# Patient Record
Sex: FEMALE | Race: WHITE | Employment: PART TIME | URBAN - NONMETROPOLITAN AREA
[De-identification: names, ages, dates, MRNs, and addresses within clinical notes are randomized per-mention and may not be internally consistent; named-entity substitution may affect disease eponyms.]

---

## 2017-10-07 ENCOUNTER — HOSPITAL ENCOUNTER (EMERGENCY)
Age: 37
Discharge: HOME | End: 2017-10-07
Payer: MEDICAID

## 2017-10-07 ENCOUNTER — APPOINTMENT (OUTPATIENT)
Dept: GENERAL RADIOLOGY | Age: 37
End: 2017-10-07
Payer: MEDICAID

## 2017-10-07 VITALS
RESPIRATION RATE: 14 BRPM | HEART RATE: 90 BPM | BODY MASS INDEX: 23.78 KG/M2 | TEMPERATURE: 98.9 F | WEIGHT: 130 LBS | DIASTOLIC BLOOD PRESSURE: 65 MMHG | OXYGEN SATURATION: 100 % | SYSTOLIC BLOOD PRESSURE: 115 MMHG

## 2017-10-07 DIAGNOSIS — S90.212A CONTUSION OF LEFT GREAT TOE WITH DAMAGE TO NAIL, INITIAL ENCOUNTER: Primary | ICD-10-CM

## 2017-10-07 DIAGNOSIS — B35.1 ONYCHOMYCOSIS: ICD-10-CM

## 2017-10-07 PROCEDURE — 73660 X-RAY EXAM OF TOE(S): CPT

## 2017-10-07 PROCEDURE — 99283 EMERGENCY DEPT VISIT LOW MDM: CPT

## 2017-10-07 ASSESSMENT — PAIN SCALES - GENERAL: PAINLEVEL_OUTOF10: 8

## 2017-10-07 ASSESSMENT — ENCOUNTER SYMPTOMS
RHINORRHEA: 0
EYE DISCHARGE: 0
SHORTNESS OF BREATH: 0
WHEEZING: 0
SORE THROAT: 0
COUGH: 0
ABDOMINAL PAIN: 0
VOMITING: 0
NAUSEA: 0
EYE PAIN: 0
DIARRHEA: 0
BACK PAIN: 0

## 2017-10-07 ASSESSMENT — PAIN DESCRIPTION - LOCATION: LOCATION: FOOT

## 2017-10-07 ASSESSMENT — PAIN DESCRIPTION - ORIENTATION: ORIENTATION: LEFT

## 2017-10-07 ASSESSMENT — PAIN DESCRIPTION - PAIN TYPE: TYPE: ACUTE PAIN

## 2017-10-07 NOTE — ED PROVIDER NOTES
of 10/7/2017  8:10 AM      CONTINUE these medications which have NOT CHANGED    Details   ibuprofen (ADVIL;MOTRIN) 800 MG tablet Take 1 tablet by mouth every 8 hours as needed for Pain, Disp-120 tablet, R-3      Prenatal Multivit-Min-Fe-FA (PRENATAL #2 PO) Take 1 tablet by mouth. ALLERGIES     has No Known Allergies. FAMILY HISTORY     indicated that her mother is alive. She indicated that her father is . She indicated that her sister is alive. She indicated that her brother is alive. family history includes Early Death in her father; Mental Illness in her father. SOCIAL HISTORY      reports that she has been smoking. She has a 5.00 pack-year smoking history. She does not have any smokeless tobacco history on file. She reports that she does not drink alcohol or use drugs. PHYSICAL EXAM     INITIAL VITALS:  weight is 130 lb (59 kg). Her oral temperature is 98.9 °F (37.2 °C). Her blood pressure is 115/65 and her pulse is 90. Her respiration is 14 and oxygen saturation is 100%. Physical Exam   Constitutional: She is oriented to person, place, and time. She appears well-developed and well-nourished. HENT:   Head: Normocephalic. Right Ear: External ear normal.   Left Ear: External ear normal.   Eyes: Conjunctivae are normal. Right eye exhibits no discharge. Left eye exhibits no discharge. Neck: Normal range of motion. Neck supple. No JVD present. Cardiovascular: Normal rate, regular rhythm and normal heart sounds. No murmur heard. Pulmonary/Chest: Effort normal and breath sounds normal. No respiratory distress. She has no wheezes. She has no rales. Abdominal: Soft. Normal appearance and bowel sounds are normal. She exhibits no distension. There is no tenderness. There is no rigidity, no rebound and no guarding. Musculoskeletal: Normal range of motion. Left foot: There is tenderness (with bruising in the nailbed of the 1st digit).  There is normal range of motion,

## 2020-03-01 ENCOUNTER — HOSPITAL ENCOUNTER (EMERGENCY)
Age: 40
Discharge: HOME | End: 2020-03-01
Payer: MEDICAID

## 2020-03-01 VITALS
HEART RATE: 110 BPM | TEMPERATURE: 97.7 F | OXYGEN SATURATION: 96 % | WEIGHT: 120 LBS | RESPIRATION RATE: 14 BRPM | SYSTOLIC BLOOD PRESSURE: 128 MMHG | BODY MASS INDEX: 21.95 KG/M2 | DIASTOLIC BLOOD PRESSURE: 75 MMHG

## 2020-03-01 LAB
GROUP A STREP CULTURE, REFLEX: NEGATIVE
REFLEX THROAT C + S: NORMAL

## 2020-03-01 PROCEDURE — 99282 EMERGENCY DEPT VISIT SF MDM: CPT

## 2020-03-01 PROCEDURE — 87880 STREP A ASSAY W/OPTIC: CPT

## 2020-03-01 PROCEDURE — 87070 CULTURE OTHR SPECIMN AEROBIC: CPT

## 2020-03-01 ASSESSMENT — ENCOUNTER SYMPTOMS
SHORTNESS OF BREATH: 0
PHOTOPHOBIA: 0
EYE REDNESS: 0
FACIAL SWELLING: 0
NAUSEA: 0
VOMITING: 0
SINUS PAIN: 0
CHEST TIGHTNESS: 0
TROUBLE SWALLOWING: 1
COLOR CHANGE: 0
SINUS PRESSURE: 0
RHINORRHEA: 1
SORE THROAT: 1

## 2020-03-01 ASSESSMENT — PAIN SCALES - GENERAL: PAINLEVEL_OUTOF10: 2

## 2020-03-01 ASSESSMENT — PAIN DESCRIPTION - PAIN TYPE: TYPE: ACUTE PAIN

## 2020-03-01 ASSESSMENT — PAIN DESCRIPTION - FREQUENCY: FREQUENCY: CONTINUOUS

## 2020-03-01 ASSESSMENT — PAIN DESCRIPTION - LOCATION: LOCATION: NECK

## 2020-03-01 ASSESSMENT — PAIN DESCRIPTION - ORIENTATION: ORIENTATION: RIGHT

## 2020-03-01 ASSESSMENT — PAIN DESCRIPTION - DESCRIPTORS: DESCRIPTORS: TENDER

## 2020-03-01 NOTE — ED TRIAGE NOTES
Patient presents to the ED with concerns of a lump/swollen right side of neck for the last couple of days. Patient has taken aspirin to help with no relief. VSS. Patient rates pain at a 2/10. Respirations easy and regular. Patient does states it hurts to swallow sometimes.

## 2020-03-03 LAB — THROAT/NOSE CULTURE: NORMAL

## 2020-07-28 NOTE — ED TRIAGE NOTES
Pt to ed with c/o great toe pain to the left foot pt rates pain 8/10 and denies taking any otc medications.  Pt waiting on provider for POC
No

## 2021-10-06 ENCOUNTER — HOSPITAL ENCOUNTER (INPATIENT)
Age: 41
LOS: 3 days | Discharge: HOME | DRG: 758 | End: 2021-10-10
Attending: EMERGENCY MEDICINE | Admitting: PSYCHIATRY & NEUROLOGY
Payer: MEDICAID

## 2021-10-06 DIAGNOSIS — R46.89 AGGRESSIVE BEHAVIOR: ICD-10-CM

## 2021-10-06 DIAGNOSIS — F41.9 ANXIETY AND DEPRESSION: Primary | ICD-10-CM

## 2021-10-06 DIAGNOSIS — F32.A ANXIETY AND DEPRESSION: Primary | ICD-10-CM

## 2021-10-06 LAB
ALBUMIN SERPL-MCNC: 4.4 G/DL (ref 3.5–5.1)
ALP BLD-CCNC: 101 U/L (ref 38–126)
ALT SERPL-CCNC: 22 U/L (ref 11–66)
AMPHETAMINE+METHAMPHETAMINE URINE SCREEN: NEGATIVE
ANION GAP SERPL CALCULATED.3IONS-SCNC: 13 MEQ/L (ref 8–16)
AST SERPL-CCNC: 33 U/L (ref 5–40)
BACTERIA: ABNORMAL /HPF
BARBITURATE QUANTITATIVE URINE: NEGATIVE
BASOPHILS # BLD: 0.4 %
BASOPHILS ABSOLUTE: 0 THOU/MM3 (ref 0–0.1)
BENZODIAZEPINE QUANTITATIVE URINE: NEGATIVE
BILIRUB SERPL-MCNC: 0.5 MG/DL (ref 0.3–1.2)
BILIRUBIN DIRECT: < 0.2 MG/DL (ref 0–0.3)
BILIRUBIN URINE: NEGATIVE
BLOOD, URINE: NEGATIVE
BUN BLDV-MCNC: 11 MG/DL (ref 7–22)
CALCIUM SERPL-MCNC: 9.2 MG/DL (ref 8.5–10.5)
CANNABINOID QUANTITATIVE URINE: POSITIVE
CASTS 2: ABNORMAL /LPF
CASTS UA: ABNORMAL /LPF
CHARACTER, URINE: CLEAR
CHLORIDE BLD-SCNC: 104 MEQ/L (ref 98–111)
CO2: 18 MEQ/L (ref 23–33)
COCAINE METABOLITE QUANTITATIVE URINE: NEGATIVE
COLOR: YELLOW
CREAT SERPL-MCNC: 1.2 MG/DL (ref 0.4–1.2)
CRYSTALS, UA: ABNORMAL
EOSINOPHIL # BLD: 0.3 %
EOSINOPHILS ABSOLUTE: 0 THOU/MM3 (ref 0–0.4)
EPITHELIAL CELLS, UA: ABNORMAL /HPF
ERYTHROCYTE [DISTWIDTH] IN BLOOD BY AUTOMATED COUNT: 13.7 % (ref 11.5–14.5)
ERYTHROCYTE [DISTWIDTH] IN BLOOD BY AUTOMATED COUNT: 48.5 FL (ref 35–45)
ETHYL ALCOHOL, SERUM: < 0.01 %
GFR SERPL CREATININE-BSD FRML MDRD: 50 ML/MIN/1.73M2
GLUCOSE BLD-MCNC: 152 MG/DL (ref 70–108)
GLUCOSE URINE: NEGATIVE MG/DL
HCT VFR BLD CALC: 41.2 % (ref 37–47)
HEMOGLOBIN: 13.3 GM/DL (ref 12–16)
IMMATURE GRANS (ABS): 0.04 THOU/MM3 (ref 0–0.07)
IMMATURE GRANULOCYTES: 0.4 %
KETONES, URINE: 15
LEUKOCYTE ESTERASE, URINE: NEGATIVE
LYMPHOCYTES # BLD: 18.6 %
LYMPHOCYTES ABSOLUTE: 1.9 THOU/MM3 (ref 1–4.8)
MCH RBC QN AUTO: 31.3 PG (ref 26–33)
MCHC RBC AUTO-ENTMCNC: 32.3 GM/DL (ref 32.2–35.5)
MCV RBC AUTO: 96.9 FL (ref 81–99)
MISCELLANEOUS 2: ABNORMAL
MONOCYTES # BLD: 6.8 %
MONOCYTES ABSOLUTE: 0.7 THOU/MM3 (ref 0.4–1.3)
NITRITE, URINE: NEGATIVE
NUCLEATED RED BLOOD CELLS: 0 /100 WBC
OPIATES, URINE: NEGATIVE
OSMOLALITY CALCULATION: 272.5 MOSMOL/KG (ref 275–300)
OXYCODONE: NEGATIVE
PH UA: 5.5 (ref 5–9)
PHENCYCLIDINE QUANTITATIVE URINE: NEGATIVE
PLATELET # BLD: 341 THOU/MM3 (ref 130–400)
PMV BLD AUTO: 8.9 FL (ref 9.4–12.4)
POTASSIUM SERPL-SCNC: 3.7 MEQ/L (ref 3.5–5.2)
PREGNANCY, SERUM: NEGATIVE
PROTEIN UA: 30
RBC # BLD: 4.25 MILL/MM3 (ref 4.2–5.4)
RBC URINE: ABNORMAL /HPF
RENAL EPITHELIAL, UA: ABNORMAL
SARS-COV-2, NAAT: NOT  DETECTED
SEG NEUTROPHILS: 73.5 %
SEGMENTED NEUTROPHILS ABSOLUTE COUNT: 7.4 THOU/MM3 (ref 1.8–7.7)
SODIUM BLD-SCNC: 135 MEQ/L (ref 135–145)
SPECIFIC GRAVITY, URINE: 1.02 (ref 1–1.03)
TOTAL PROTEIN: 7.5 G/DL (ref 6.1–8)
TSH SERPL DL<=0.05 MIU/L-ACNC: 1.29 UIU/ML (ref 0.4–4.2)
UROBILINOGEN, URINE: 0.2 EU/DL (ref 0–1)
WBC # BLD: 10.1 THOU/MM3 (ref 4.8–10.8)
WBC UA: ABNORMAL /HPF
YEAST: ABNORMAL

## 2021-10-06 PROCEDURE — 81001 URINALYSIS AUTO W/SCOPE: CPT

## 2021-10-06 PROCEDURE — 82248 BILIRUBIN DIRECT: CPT

## 2021-10-06 PROCEDURE — 80307 DRUG TEST PRSMV CHEM ANLYZR: CPT

## 2021-10-06 PROCEDURE — 99285 EMERGENCY DEPT VISIT HI MDM: CPT

## 2021-10-06 PROCEDURE — 85025 COMPLETE CBC W/AUTO DIFF WBC: CPT

## 2021-10-06 PROCEDURE — 82077 ASSAY SPEC XCP UR&BREATH IA: CPT

## 2021-10-06 PROCEDURE — 87635 SARS-COV-2 COVID-19 AMP PRB: CPT

## 2021-10-06 PROCEDURE — 80053 COMPREHEN METABOLIC PANEL: CPT

## 2021-10-06 PROCEDURE — 84703 CHORIONIC GONADOTROPIN ASSAY: CPT

## 2021-10-06 PROCEDURE — 36415 COLL VENOUS BLD VENIPUNCTURE: CPT

## 2021-10-06 PROCEDURE — 84443 ASSAY THYROID STIM HORMONE: CPT

## 2021-10-06 ASSESSMENT — ENCOUNTER SYMPTOMS
SHORTNESS OF BREATH: 0
COUGH: 0
VOMITING: 0
CONSTIPATION: 0
DIARRHEA: 0
BACK PAIN: 0
SORE THROAT: 0
NAUSEA: 0
TROUBLE SWALLOWING: 0
ABDOMINAL PAIN: 0
CHEST TIGHTNESS: 0
WHEEZING: 0
SINUS PRESSURE: 0
RHINORRHEA: 0
VOICE CHANGE: 0

## 2021-10-07 PROBLEM — F63.81 INTERMITTENT EXPLOSIVE DISORDER IN ADULT: Chronic | Status: ACTIVE | Noted: 2021-10-07

## 2021-10-07 PROBLEM — F63.81 INTERMITTENT EXPLOSIVE DISORDER: Status: ACTIVE | Noted: 2021-10-07

## 2021-10-07 PROBLEM — F12.20 CANNABIS USE DISORDER, MODERATE, DEPENDENCE (HCC): Chronic | Status: ACTIVE | Noted: 2021-10-07

## 2021-10-07 PROBLEM — F10.20 ALCOHOL USE DISORDER, MODERATE, DEPENDENCE (HCC): Chronic | Status: ACTIVE | Noted: 2021-10-07

## 2021-10-07 PROBLEM — F32.A DEPRESSIVE DISORDER: Status: ACTIVE | Noted: 2021-10-07

## 2021-10-07 PROBLEM — F41.0 PANIC DISORDER WITHOUT AGORAPHOBIA: Chronic | Status: ACTIVE | Noted: 2021-10-07

## 2021-10-07 PROCEDURE — G0378 HOSPITAL OBSERVATION PER HR: HCPCS

## 2021-10-07 PROCEDURE — 6370000000 HC RX 637 (ALT 250 FOR IP): Performed by: PSYCHIATRY & NEUROLOGY

## 2021-10-07 PROCEDURE — 1240000000 HC EMOTIONAL WELLNESS R&B

## 2021-10-07 RX ORDER — ACETAMINOPHEN 325 MG/1
650 TABLET ORAL EVERY 4 HOURS PRN
Status: DISCONTINUED | OUTPATIENT
Start: 2021-10-07 | End: 2021-10-10 | Stop reason: HOSPADM

## 2021-10-07 RX ORDER — IBUPROFEN 400 MG/1
400 TABLET ORAL EVERY 6 HOURS PRN
Status: DISCONTINUED | OUTPATIENT
Start: 2021-10-07 | End: 2021-10-10 | Stop reason: HOSPADM

## 2021-10-07 RX ORDER — DIVALPROEX SODIUM 500 MG/1
500 TABLET, EXTENDED RELEASE ORAL DAILY
Status: DISCONTINUED | OUTPATIENT
Start: 2021-10-07 | End: 2021-10-10 | Stop reason: HOSPADM

## 2021-10-07 RX ORDER — TRAZODONE HYDROCHLORIDE 50 MG/1
50 TABLET ORAL NIGHTLY PRN
Status: DISCONTINUED | OUTPATIENT
Start: 2021-10-07 | End: 2021-10-10 | Stop reason: HOSPADM

## 2021-10-07 RX ORDER — SERTRALINE HYDROCHLORIDE 100 MG/1
100 TABLET, FILM COATED ORAL DAILY
Status: DISCONTINUED | OUTPATIENT
Start: 2021-10-07 | End: 2021-10-10 | Stop reason: HOSPADM

## 2021-10-07 RX ORDER — POLYETHYLENE GLYCOL 3350 17 G/17G
17 POWDER, FOR SOLUTION ORAL DAILY PRN
Status: DISCONTINUED | OUTPATIENT
Start: 2021-10-07 | End: 2021-10-10 | Stop reason: HOSPADM

## 2021-10-07 RX ORDER — HYDROXYZINE HYDROCHLORIDE 25 MG/1
50 TABLET, FILM COATED ORAL 3 TIMES DAILY PRN
Status: DISCONTINUED | OUTPATIENT
Start: 2021-10-07 | End: 2021-10-10 | Stop reason: HOSPADM

## 2021-10-07 RX ADMIN — HYDROXYZINE HYDROCHLORIDE 50 MG: 25 TABLET, FILM COATED ORAL at 06:04

## 2021-10-07 RX ADMIN — IBUPROFEN 400 MG: 400 TABLET, FILM COATED ORAL at 06:04

## 2021-10-07 RX ADMIN — IBUPROFEN 400 MG: 400 TABLET, FILM COATED ORAL at 15:27

## 2021-10-07 RX ADMIN — SERTRALINE 100 MG: 100 TABLET, FILM COATED ORAL at 12:07

## 2021-10-07 RX ADMIN — HYDROXYZINE HYDROCHLORIDE 50 MG: 25 TABLET, FILM COATED ORAL at 21:25

## 2021-10-07 RX ADMIN — DIVALPROEX SODIUM 500 MG: 500 TABLET, EXTENDED RELEASE ORAL at 12:08

## 2021-10-07 RX ADMIN — ACETAMINOPHEN 650 MG: 325 TABLET ORAL at 10:04

## 2021-10-07 ASSESSMENT — PAIN SCALES - GENERAL
PAINLEVEL_OUTOF10: 4
PAINLEVEL_OUTOF10: 5
PAINLEVEL_OUTOF10: 3
PAINLEVEL_OUTOF10: 3
PAINLEVEL_OUTOF10: 8
PAINLEVEL_OUTOF10: 5

## 2021-10-07 ASSESSMENT — PAIN DESCRIPTION - PAIN TYPE
TYPE: ACUTE PAIN

## 2021-10-07 ASSESSMENT — PAIN - FUNCTIONAL ASSESSMENT
PAIN_FUNCTIONAL_ASSESSMENT: ACTIVITIES ARE NOT PREVENTED

## 2021-10-07 ASSESSMENT — SLEEP AND FATIGUE QUESTIONNAIRES
DO YOU USE A SLEEP AID: NO
SLEEP PATTERN: DISTURBED/INTERRUPTED SLEEP
DIFFICULTY ARISING: NO
DO YOU HAVE DIFFICULTY SLEEPING: YES
RESTFUL SLEEP: NO
AVERAGE NUMBER OF SLEEP HOURS: 4
DIFFICULTY STAYING ASLEEP: YES
DIFFICULTY FALLING ASLEEP: NO

## 2021-10-07 ASSESSMENT — PAIN DESCRIPTION - FREQUENCY
FREQUENCY: CONTINUOUS
FREQUENCY: INTERMITTENT
FREQUENCY: INTERMITTENT

## 2021-10-07 ASSESSMENT — PAIN DESCRIPTION - DESCRIPTORS
DESCRIPTORS: ACHING
DESCRIPTORS: HEADACHE
DESCRIPTORS: HEADACHE

## 2021-10-07 ASSESSMENT — PAIN DESCRIPTION - LOCATION
LOCATION: HEAD

## 2021-10-07 ASSESSMENT — PAIN DESCRIPTION - ORIENTATION
ORIENTATION: ANTERIOR
ORIENTATION: MID
ORIENTATION: ANTERIOR

## 2021-10-07 ASSESSMENT — PAIN DESCRIPTION - ONSET
ONSET: GRADUAL
ONSET: ON-GOING
ONSET: ON-GOING

## 2021-10-07 ASSESSMENT — PATIENT HEALTH QUESTIONNAIRE - PHQ9: SUM OF ALL RESPONSES TO PHQ QUESTIONS 1-9: 19

## 2021-10-07 ASSESSMENT — PAIN DESCRIPTION - PROGRESSION
CLINICAL_PROGRESSION: NOT CHANGED
CLINICAL_PROGRESSION: NOT CHANGED

## 2021-10-07 ASSESSMENT — LIFESTYLE VARIABLES: HISTORY_ALCOHOL_USE: YES

## 2021-10-07 NOTE — GROUP NOTE
Group Therapy Note    Date: 10/7/2021    Group Start Time: 1300  Group End Time: 0734  Group Topic: Psychotherapy    [facility]    SCOT Conner        Group Therapy Note    Attendees: 3         Patient's Goal:  Discuss empowering questions with peers. Notes:  Patient contributed to group conversation in a positive manner. Patient shared with peers the importance of her relationship with her children and how this motivates her to get treatment. Status After Intervention:  Improved    Participation Level:  Active Listener and Interactive    Participation Quality: Appropriate, Attentive and Supportive      Speech:  normal      Thought Process/Content: Logical      Affective Functioning: Congruent      Mood: euthymic      Level of consciousness:  Alert, Oriented x4 and Attentive      Response to Learning: Able to verbalize current knowledge/experience, Able to verbalize/acknowledge new learning, Able to retain information, Capable of insight, Able to change behavior and Progressing to goal      Endings: None Reported    Modes of Intervention: Support and Socialization      Discipline Responsible: /Counselor      Signature:  SCOT Conner

## 2021-10-07 NOTE — PLAN OF CARE
Patient has attended at least 2 groups, participated in another group and has been out of her room for social interaction so she has met her socialization goal for this shift.

## 2021-10-07 NOTE — PROGRESS NOTES
This RN has reviewed and agrees with this student nurse,Kenney Garcia's data collection and has collaborated with this student nurse assessment and documentation.

## 2021-10-07 NOTE — BH NOTE
Behavioral Health   Admission Note     Admission Type:   Admission Type: Voluntary    Reason for admission:  Reason for Admission: Depression and anxiety    PATIENT STRENGTHS:  Strengths: Communication    Patient Strengths and Limitations:  Limitations: General negative or hopeless attitude about future/recovery    Addictive Behavior:   Addictive Behavior  In the past 3 months, have you felt or has someone told you that you have a problem with:  : Excessive Fluid intake  Do you have a history of Chemical Use?: No  Do you have a history of Alcohol Use?: Yes  Do you have a history of Street Drug Abuse?: Yes  Histroy of Prescripton Drug Abuse?: No    Medical Problems:   Past Medical History:   Diagnosis Date    Psychiatric problem        Status EXAM:  Status and Exam  Normal: No  Facial Expression: Flat  Affect: Blunt  Level of Consciousness: Alert  Mood:Normal: No  Mood: Depressed, Anxious, Sad  Motor Activity:Normal: Yes  Interview Behavior: Cooperative  Preception: [place] to Person, [place] to Time, [place] to Place, [place] to Situation  Attention:Normal: Yes  Thought Processes: Circumstantial  Thought Content:Normal: Yes  Hallucinations: None  Delusions: Yes  Delusions: Other(See Comment) (Paranoid feels she has to constantly look over her shoulder)  Memory:Normal: No  Memory: Poor Recent  Insight and Judgment: No  Insight and Judgment: Poor Judgment, Poor Insight  Present Suicidal Ideation: No  Present Homicidal Ideation: No    Pt admitted with followings belongings:  Dentures: None  Vision - Corrective Lenses: None  Hearing Aid: None  Jewelry: None  Body Piercings Removed: N/A  Clothing: Footwear, Pants, Shirt, Sweater, Socks, Undergarments (Comment), Jacket / coat  Were All Patient Medications Collected?: Not Applicable  Other Valuables: Other (Comment) (ID and medical card)     Admission order obtained yes. Valuables sent home with no.  Valuables placed in safe in security envelope, number:  na. Patient's home
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
## 2021-10-07 NOTE — PLAN OF CARE
Problem: Pain:  Goal: Pain level will decrease  Description: Pain level will decrease  Outcome: Ongoing  Note: PRN pain medication given for pt. C/O : headache  Goal: Control of acute pain  Description: Control of acute pain  Outcome: Ongoing  Note: ongoing  Goal: Control of chronic pain  Description: Control of chronic pain  Outcome: Ongoing  Note: ongoing     Problem: Anxiety:  Goal: Level of anxiety will decrease  Description: Level of anxiety will decrease  Outcome: Ongoing  Note: Patient attended group and out with peers. Calm at this time. Problem: Activity:  Goal: Sleeping patterns will improve  Description: Sleeping patterns will improve  Outcome: Ongoing  Note: ongoing     Problem: Altered Mood, Depressive Behavior:  Goal: Able to verbalize acceptance of life and situations over which he or she has no control  Description: Able to verbalize acceptance of life and situations over which he or she has no control  Outcome: Ongoing  Note: ongoing  Goal: Able to verbalize and/or display a decrease in depressive symptoms  Description: Able to verbalize and/or display a decrease in depressive symptoms  Outcome: Ongoing  Goal: Ability to disclose and discuss suicidal ideas will improve  Description: Ability to disclose and discuss suicidal ideas will improve  Outcome: Ongoing  Note: Denies suicidal thoughts. Goal: Able to verbalize support systems  Description: Able to verbalize support systems  Outcome: Ongoing  Goal: Absence of self-harm  Description: Absence of self-harm  Outcome: Ongoing  Goal: Patient specific goal  Description: Patient specific goal  Outcome: Ongoing  Note: Go to groups. met  Goal: Participates in care planning  Description: Participates in care planning  Outcome: Ongoing  Note: Care plan reviewed with patient . Patient verbalizes understanding of the plan of care and contribute to goal setting.         Problem: Substance Abuse:  Goal: Absence of drug withdrawal signs and symptoms  Description: Absence of drug withdrawal signs and symptoms  Outcome: Ongoing  Note: No outward signs of withdrawal. No complaints by patient.    Goal: Patient specific goal  Description: Patient specific goal  Outcome: Ongoing     Problem: Coping:  Goal: Ability to identify problematic behaviors that deter socialization will improve  Description: Ability to identify problematic behaviors that deter socialization will improve  10/7/2021 1333 by Anant Bryan RN  Outcome: Ongoing  Note: ongoing  10/7/2021 1331 by Shalom Mann  Outcome: Met This Shift

## 2021-10-07 NOTE — BH NOTE
Group Therapy Note    Date: 10/7/2021     Start Time:  1000  End Time:   1386    Number of Participants: 5    Type of Group: Recreational/Coping Skills/Social    Patient's Goal: To increase knowledge of positive coping skills and socialization. Notes:   Patient participated in coping skills jenga and shared with others in the group. Patient demonstrated fair concentration. Appeared anxious.      Status After Intervention:  Improved    Participation Level: Interactive    Participation Quality: Appropriate and Sharing      Speech:  normal      Thought Process/Content: Logical      Affective Functioning: Congruent      Mood: anxious and euthymic      Level of consciousness:  Oriented x4      Response to Learning: Progressing to goal      Endings: None Reported    Modes of Intervention: Education, Socialization, Exploration and Activity      Discipline Responsible: Psychoeducational Specialist      Signature:  Mable Barahona

## 2021-10-07 NOTE — GROUP NOTE
Group Therapy Note    Date: 10/7/2021    Group Start Time: 1100  Group End Time: 1140  Group Topic: Healthy Living/Wellness    [facility] 4[facility]    Dionisio Ennis RN        Group Therapy Note    Attendees: 4           Patient did not attend.     Discipline Responsible: Registered Nurse      Signature:  Dionisio Ennis RN
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
## 2021-10-07 NOTE — PROGRESS NOTES
Report given to nurse Eloise Moore. Notified of abnormal B/P, Pulse and EKG and that hospitalist was notified.
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
## 2021-10-08 PROCEDURE — 1240000000 HC EMOTIONAL WELLNESS R&B

## 2021-10-08 PROCEDURE — G0378 HOSPITAL OBSERVATION PER HR: HCPCS

## 2021-10-08 PROCEDURE — 6370000000 HC RX 637 (ALT 250 FOR IP): Performed by: PSYCHIATRY & NEUROLOGY

## 2021-10-08 RX ADMIN — SERTRALINE 100 MG: 100 TABLET, FILM COATED ORAL at 07:54

## 2021-10-08 RX ADMIN — HYDROXYZINE HYDROCHLORIDE 50 MG: 25 TABLET, FILM COATED ORAL at 21:18

## 2021-10-08 RX ADMIN — DIVALPROEX SODIUM 500 MG: 500 TABLET, EXTENDED RELEASE ORAL at 07:54

## 2021-10-08 RX ADMIN — TRAZODONE HYDROCHLORIDE 50 MG: 50 TABLET ORAL at 21:18

## 2021-10-08 ASSESSMENT — PAIN SCALES - WONG BAKER: WONGBAKER_NUMERICALRESPONSE: 0

## 2021-10-08 ASSESSMENT — PAIN SCALES - GENERAL
PAINLEVEL_OUTOF10: 0
PAINLEVEL_OUTOF10: 0

## 2021-10-08 ASSESSMENT — PAIN DESCRIPTION - PAIN TYPE: TYPE: ACUTE PAIN

## 2021-10-08 NOTE — PROGRESS NOTES
Group Therapy Note    Date: 10/7/2021  Start Time: 2000  End Time:  2020      Type of Group: Wrap-Up and relaxation        Patient's Goal:  To go to groups. Notes:  Met    Status After Intervention:  Improved    Participation Level:  Active Listener and Interactive    Participation Quality: Appropriate, Attentive and Sharing      Speech:  normal      Thought Process/Content: Logical      Affective Functioning: Congruent      Mood: anxious and depressed      Level of consciousness:  Alert and Oriented x4      Response to Learning: Able to verbalize current knowledge/experience      Endings: None Reported    Modes of Intervention: Support and Socialization      Discipline Responsible: Registered Nurse      Signature:  Barbara Bradford RN

## 2021-10-08 NOTE — PROGRESS NOTES
Group Therapy Note    Date: 10/8/2021  Start Time: 1330  End Time:  1440  Number of Participants: 8    Type of Group: Psychotherapy      Notes:  Pt is present for group with appropriate interaction. Peers explored their personal resistance to taking psychotropic medication. Disease model education was provided, as pt's explored their own failed attempts to manage anxiety/depression on their own. Pt was willing to examine her own thinking errors, beliefs based on inaccurate information, as creating barriers to what she is attempting to achieve. Status After Intervention:  Improved    Participation Level:  Active Listener and Interactive    Participation Quality: Appropriate, Attentive and Sharing      Speech:  normal      Thought Process/Content: Logical  Linear      Affective Functioning: Congruent      Mood: Dysphoric      Level of consciousness:  Alert, Oriented x4 and Attentive      Response to Learning: Able to verbalize current knowledge/experience, Able to verbalize/acknowledge new learning, Able to retain information, Capable of insight, Able to change behavior and Progressing to goal      Endings: None Reported    Modes of Intervention: Education, Support, Socialization, Exploration, Clarifying and Problem-solving      Discipline Responsible: /Counselor      Signature:  SCOT Fernández

## 2021-10-08 NOTE — PLAN OF CARE
Problem: Pain:  Goal: Pain level will decrease  Description: Pain level will decrease  Outcome: Met This Shift  Note: Pt had no C/O pain at time of assessment, will continue to monitor     Problem: Anxiety:  Goal: Level of anxiety will decrease  Description: Level of anxiety will decrease  Outcome: Ongoing  Note: Pt had little C/O anxiety at time of assessment, will continue to monitor     Problem: Activity:  Goal: Sleeping patterns will improve  Description: Sleeping patterns will improve  Outcome: Ongoing  Note: Patient slept 6.5 hours last night, reports they  slept well. Encouraged patient not to take naps during the day, relax several hours before bed and to take prescribed sleep meds as ordered. Patient  verbalized understanding. Problem: Altered Mood, Depressive Behavior:  Goal: Able to verbalize acceptance of life and situations over which he or she has no control  Description: Able to verbalize acceptance of life and situations over which he or she has no control  Outcome: Ongoing  Note: Patient verbalizes some acceptance of situations over which she has no control. Encouraged patient to attend group therapy. Goal: Able to verbalize and/or display a decrease in depressive symptoms  Description: Able to verbalize and/or display a decrease in depressive symptoms  Outcome: Ongoing  Note: Pt had good eye contact, is flat, blunt, brightens and is participating in groups  Goal: Ability to disclose and discuss suicidal ideas will improve  Description: Ability to disclose and discuss suicidal ideas will improve  Outcome: Met This Shift  Note: Patient denies suicidal ideations, no plan or intent to harm self. Patient remains on 15 checks for safety. Instructed to seek staff as needed for thoughts of self harm. Goal: Absence of self-harm  Description: Absence of self-harm  Outcome: Met This Shift  Note: No self harm behaviors were observed or reported so far this shift.  Remains on every 15 minutes precautions for safety. Goal: Patient specific goal  Description: Patient specific goal  Outcome: Ongoing  Note: To try to not be so anxious - ongoing  Goal: Participates in care planning  Description: Participates in care planning  Outcome: Met This Shift  Note: This patient participates in care planning      Problem: Substance Abuse:  Goal: Absence of drug withdrawal signs and symptoms  Description: Absence of drug withdrawal signs and symptoms  Outcome: Met This Shift  Note: No S/S of withdrawal noted, will continue to monitor  Goal: Patient specific goal  Description: Patient specific goal  Outcome: Ongoing  Note: To try to not be so anxious - ongoing     Problem: Coping:  Goal: Ability to identify problematic behaviors that deter socialization will improve  Description: Ability to identify problematic behaviors that deter socialization will improve  10/8/2021 1244 by Luciano Navarro RN  Outcome: Ongoing  10/8/2021 1220 by Sangita Quesada  Outcome: Ongoing   Care plan reviewed with patient.   Patient does verbalize understanding of the plan of care and does contribute to goal setting

## 2021-10-08 NOTE — BH NOTE
PLAN OF CARE:     Start Time:  0900  End Time:   0930    Group Topic:  Daily Goals    Group Type:   Goal Group    Intervention/Goal:  To increase support and identify daily goals    Attendance:  Attended group    Affect:   Brightens with interaction    Behavior:   Cooperative but anxious    Response:  appropriate    Daily Goal:  To try not to be so anxious.      Progress:  Progressing to goal

## 2021-10-08 NOTE — PLAN OF CARE
Problem: Pain:  Goal: Pain level will decrease  10/7/2021 2142 by Gabby Munoz RN  Outcome: Ongoing  Note: Patient reports to experience a headache with no relief after being given and prn Motrin and prn Tylenol on days Patient reports that she drinks a 2 litters of caffeinated beverages daily. Patient given a caffeinated drink and her headache was resolved. 10/7/2021 1333 by Allyson Ross RN  Outcome: Ongoing  Note: PRN pain medication given for pt. C/O : headache  Goal: Control of acute pain  10/7/2021 2142 by Gabby Munoz RN  Outcome: Completed  10/7/2021 1333 by Allyson Ross RN  Outcome: Ongoing  Note: ongoing  Goal: Control of chronic pain  10/7/2021 2142 by Gabby Munoz RN  Outcome: Completed  10/7/2021 1333 by Allyson Ross RN  Outcome: Ongoing  Note: ongoing     Problem: Anxiety:  Goal: Level of anxiety will decrease  10/7/2021 2142 by Gabby Munoz RN  Outcome: Ongoing  Note: Patient continues to have periods of increased anxiety. PRN medication given per order for anxiety. 10/7/2021 1333 by Allyson Ross RN  Outcome: Ongoing  Note: Patient attended group and out with peers. Calm at this time. Problem: Activity:  Goal: Sleeping patterns will improve  10/7/2021 2142 by Gabby Munoz RN  Outcome: Ongoing  Note: Patient was a early admission and did not sleep well. Patient reports that she will ask this staff RN if she needs Trazodone to assist with sleep. 10/7/2021 1333 by Allyson Ross RN  Outcome: Ongoing  Note: ongoing     Problem: Altered Mood, Depressive Behavior:  Goal: Able to verbalize acceptance of life and situations over which he or she has no control  10/7/2021 2142 by Gabby Munoz RN  Outcome: Ongoing  Note: Patient verbalizes that she is working toward acceptance of life and the situations she has no control over.    10/7/2021 1333 by Allyson Ross RN  Outcome: Ongoing  Note: ongoing  Goal: Able to verbalize and/or display a decrease in depressive symptoms  10/7/2021 2142 by Usman Holder RN  Outcome: Ongoing  Note: Patient is sad and tearful during the shift assessment. Patient rates josette mood at a # 4. Support and encouragement given . 10/7/2021 1333 by Anne Daigle RN  Outcome: Ongoing  Goal: Ability to disclose and discuss suicidal ideas will improve  10/7/2021 2142 by Usman Holder RN  Outcome: Met This Shift  Note: Patient denies suicidal thoughts this shift. 10/7/2021 1333 by Anne Daigle RN  Outcome: Ongoing  Note: Denies suicidal thoughts. Goal: Able to verbalize support systems  10/7/2021 2142 by Usman Holder RN  Outcome: Completed  Note: Patient reports that her spouse and family are supportive. 10/7/2021 1333 by Anne Daigle RN  Outcome: Ongoing  Goal: Absence of self-harm  10/7/2021 2142 by Usman Holder RN  Outcome: Met This Shift  Note: Patient remains free of self-harm this shift. 10/7/2021 1333 by Anne Daigle RN  Outcome: Ongoing  Goal: Patient specific goal  10/7/2021 2142 by Usman Holder RN  Outcome: Met This Shift  Note: Patient set a goal to go to groups. 10/7/2021 1333 by Anne Daigle RN  Outcome: Ongoing  Note: Go to groups. met  Goal: Participates in care planning  10/7/2021 2142 by Usman Holder RN  Outcome: Met This Shift  Note: Care plan reviewed with patient. Patient does verbalize understanding of the plan of care and does contribute to goal setting . 10/7/2021 1333 by Anne Daigle RN  Outcome: Ongoing  Note: Care plan reviewed with patient . Patient verbalizes understanding of the plan of care and contribute to goal setting. Problem: Substance Abuse:  Goal: Absence of drug withdrawal signs and symptoms  10/7/2021 2142 by Usman Holder RN  Outcome: Met This Shift  Note: No signs and/or symptoms of withdraw noted. 10/7/2021 1333 by Anne Daigle RN  Outcome: Ongoing  Note: No outward signs of withdrawal. No complaints by patient.    Goal: Patient specific goal  10/7/2021 2142 by Manolo Triplett RN  Outcome: Met This Shift  Note: Patient met her goal to go to groups.   10/7/2021 1333 by Radha Mccarthy RN  Outcome: Ongoing     Problem: Coping:  Goal: Ability to identify problematic behaviors that deter socialization will improve  10/7/2021 2142 by Manolo Triplett RN  Outcome: Ongoing  10/7/2021 1333 by Radha Mccarthy RN  Outcome: Ongoing  Note: ongoing  10/7/2021 1331 by Gennett Gosselin  Outcome: Met This Shift

## 2021-10-08 NOTE — PROGRESS NOTES
Daily Progress Note  Yadira Martines MD  10/8/2021    Reviewed patient's current plan of care and vital signs with nursing staff. Sleep:  6.5 hours last night broken  Attending groups: Yes  No reported Suicidal thought; good interaction with peers & staff; tearful at times brighten on approach; no major mood swings. Mood 4 on a scale of 1 to 10 with 10 is feeling normal but she denies feeling depressed. SUBJECTIVE:    Patient is feeling better. SUICIDAL IDEATION denies suicidal ideation. Patient does not have medication side effects. ROS: Patient has new complaints:  No  Sleeping adequately:  Yes  Visitors: Yes    Mental Status Examination:  Patient is cooperative. Speech: Normal rate and tone. No abnormal movements, tics or mannerisms. Mood anxious and dysthymic; affect normal affect. Suicidal ideation Absent. Homicidal ideations Absent. Hallucinations Absent. Delusions Absent. Thought Content: normal. Thought Processes: Goal-Directed. Alert and oriented X 3. Attention and concentration fair. MEMORY intact. Insight and Judgement fair. Data   height is 5' 2\" (1.575 m) and weight is 130 lb (59 kg). Her tympanic temperature is 98.7 °F (37.1 °C). Her blood pressure is 131/86 and her pulse is 75. Her respiration is 16 and oxygen saturation is 97%.    Labs:   Admission on 10/06/2021   Component Date Value Ref Range Status    WBC 10/06/2021 10.1  4.8 - 10.8 thou/mm3 Final    RBC 10/06/2021 4.25  4.20 - 5.40 mill/mm3 Final    Hemoglobin 10/06/2021 13.3  12.0 - 16.0 gm/dl Final    Hematocrit 10/06/2021 41.2  37.0 - 47.0 % Final    MCV 10/06/2021 96.9  81.0 - 99.0 fL Final    MCH 10/06/2021 31.3  26.0 - 33.0 pg Final    MCHC 10/06/2021 32.3  32.2 - 35.5 gm/dl Final    RDW-CV 10/06/2021 13.7  11.5 - 14.5 % Final    RDW-SD 10/06/2021 48.5* 35.0 - 45.0 fL Final    Platelets 10/17/7215 341  130 - 400 thou/mm3 Final    MPV 10/06/2021 8.9* 9.4 - 12.4 fL Final    Seg Neutrophils 10/06/2021 73.5  % Final  Lymphocytes 10/06/2021 18.6  % Final    Monocytes 10/06/2021 6.8  % Final    Eosinophils 10/06/2021 0.3  % Final    Basophils 10/06/2021 0.4  % Final    Immature Granulocytes 10/06/2021 0.4  % Final    Segs Absolute 10/06/2021 7.4  1 - 7 thou/mm3 Final    Lymphocytes Absolute 10/06/2021 1.9  1.0 - 4.8 thou/mm3 Final    Monocytes Absolute 10/06/2021 0.7  0.4 - 1.3 thou/mm3 Final    Eosinophils Absolute 10/06/2021 0.0  0.0 - 0.4 thou/mm3 Final    Basophils Absolute 10/06/2021 0.0  0.0 - 0.1 thou/mm3 Final    Immature Grans (Abs) 10/06/2021 0.04  0.00 - 0.07 thou/mm3 Final    nRBC 10/06/2021 0  /100 wbc Final    Performed at [address], [address]    Sodium 10/06/2021 135  135 - 145 meq/L Final    Potassium 10/06/2021 3.7  3.5 - 5.2 meq/L Final    Chloride 10/06/2021 104  98 - 111 meq/L Final    CO2 10/06/2021 18* 23 - 33 meq/L Final    Glucose 10/06/2021 152* 70 - 108 mg/dL Final    BUN 10/06/2021 11  7 - 22 mg/dL Final    CREATININE 10/06/2021 1.2  0.4 - 1.2 mg/dL Final    Calcium 10/06/2021 9.2  8.5 - 10.5 mg/dL Final    Performed at [address], [address]    Anion Gap 10/06/2021 13.0  8.0 - 16.0 meq/L Final    Comment: ANION GAP = Sodium -(Chloride + CO2)  Performed at [address], [address]      Est, Glom Filt Rate 10/06/2021 50* ml/min/1.73m2 Final    Comment: Stage Description                    GFR, ml/min/1.73 m2   -   At increased risk               > or = 60 (with chronic                                       kidney disease risk factors)   1   Normal or increased GFR         > or = 90   2   Mildly or decreased GFR         60 - 89   3   Moderately decreased GFR        30 - 59   4   Severely decreased GFR          15 - 29   5   Kidney failure                  <15 (or dialysis)  Estimated GFR calculated using abbreviated MDRD formula as  recommended by [employer].   Calculation based  upon serum creatinine and adjusted for age, gender & race. [place]. Internal Med., Vol. 139 (2) pg 137-147.   Performed at [address] [place] [address], [address]      Osmolality Calc 10/06/2021 272.5* 275.0 - 300.0 mOsmol/kg Final    Performed at [address] [place] [address], [address]    Glucose, Ur 10/06/2021 NEGATIVE  NEGATIVE mg/dl Final    Bilirubin Urine 10/06/2021 NEGATIVE  NEGATIVE Final    Ketones, Urine 10/06/2021 15* NEGATIVE Final    Specific Gravity, Urine 10/06/2021 1.021  1.002 - 1.030 Final    Blood, Urine 10/06/2021 NEGATIVE  NEGATIVE Final    pH, UA 10/06/2021 5.5  5.0 - 9.0 Final    Protein, UA 10/06/2021 30* NEGATIVE Final    Urobilinogen, Urine 10/06/2021 0.2  0.0 - 1.0 eu/dl Final    Nitrite, Urine 10/06/2021 NEGATIVE  NEGATIVE Final    Leukocyte Esterase, Urine 10/06/2021 NEGATIVE  NEGATIVE Final    Color, UA 10/06/2021 YELLOW  STRAW-YELLOW Final    Character, Urine 10/06/2021 CLEAR  CLEAR-SL CLOUD Final    RBC, UA 10/06/2021 0-2  0-2/hpf /hpf Final    WBC, UA 10/06/2021 2-4  0-4/hpf /hpf Final    Epithelial Cells, UA 10/06/2021 3-5  3-5/hpf /hpf Final    Bacteria, UA 10/06/2021 NONE SEEN  FEW/NONE SEEN /hpf Final    Casts UA 10/06/2021 NONE SEEN  NONE SEEN /lpf Final    Crystals, UA 10/06/2021 NONE SEEN  NONE SEEN Final    Renal Epithelial, UA 10/06/2021 NONE SEEN  NONE SEEN Final    Yeast, UA 10/06/2021 NONE SEEN  NONE SEEN Final    CASTS 2 10/06/2021 NONE SEEN  NONE SEEN /lpf Final    MISCELLANEOUS 2 10/06/2021 NONE SEEN   Final    Performed at [address], [address]    Preg, Serum 10/06/2021 NEGATIVE  NEGATIVE Final    Performed at [address] [place] [id number]    Albumin 10/06/2021 4.4  3.5 - 5.1 g/dL Final    Total Bilirubin 10/06/2021 0.5  0.3 - 1.2 mg/dL Final    Bilirubin, Direct 10/06/2021 <0.2  0.0 - 0.3 mg/dL Final    Alkaline Phosphatase 10/06/2021 101  38 - 126 U/L Final    AST 10/06/2021 33  5 - 40 U/L Final    ALT 10/06/2021 22  11 - 66 U/L Final    Total Protein 10/06/2021 7.5  6.1 - 8.0 g/dL Final    Performed at [address] [place] [address], [address]    TSH 10/06/2021 1.290  0.400 - 4.200 uIU/mL Final    Performed at [address] [place] [address], [address]    AMPHETAMINE+METHAMPHETAMINE URINE * 10/06/2021 Negative  NEGATIVE Final    Barbiturate Quant, Ur 10/06/2021 Negative  NEGATIVE Final    Benzodiazepine Quant, Ur 10/06/2021 Negative  NEGATIVE Final    Cannabinoid Quant, Ur 10/06/2021 POSITIVE  NEGATIVE Final    Cocaine Metab Quant, Ur 10/06/2021 Negative  NEGATIVE Final    Opiates, Urine 10/06/2021 Negative  NEGATIVE Final    Oxycodone 10/06/2021 Negative  NEGATIVE Final    PCP Quant, Ur 10/06/2021 Negative  NEGATIVE Final    Comment: A \"Negative\" result for a drug abuse screen test indicates     that the drug concentration is below the following cutoffs:            Amphetamine/Methamphetamine     1000 ng/ml            Barbiturate                      200 ng/ml            Benzodiazapine                   200 ng/ml            Cannabinoids                      50 ng/ml            Cocaine Metabolite               300 ng/ml            Opiates                          300 ng/ml            Oxycodone                        100 ng/ml            Phencyclidine                     25 ng/ml  A \"Positive\" result for a drug abuse screen test should be     considered presumptive positive until/unless confirmed     by another method. (Additional request)  Quantitative values from a reference laboratory are     available upon additional request.  These results are for medical use only.   Performed at [address] [place] [address], [address] ETHYL ALCOHOL, SERUM 10/06/2021 < 0.01  0.00 % Final    Performed at [address] [place] [address], [address]    SARS-CoV-2, NAAT 10/06/2021 NOT  DETECTED  NOT DETECTED Final    Comment: Rapid NAAT:   Negative results should be treated as presumptive and,  if inconsistent with clinical signs and symptoms or necessary for  patient management, should be tested with an alternative molecular  assay. Negative results do not preclude SARS-CoV-2 infection and  should not be used as the sole basis for patient management decisions. This test has been authorized by the FDA under an Emergency Use  Authorization (EUA) for use by authorized laboratories. Fact sheet for Healthcare Providers:  [employer]  Fact sheet for Patients: [employer]    METHODOLOGY: Isothermal Nucleic Acid Amplification  Performed at [address], [address]              Medications  Current Facility-Administered Medications: acetaminophen (TYLENOL) tablet 650 mg, 650 mg, Oral, Q4H PRN  ibuprofen (ADVIL;MOTRIN) tablet 400 mg, 400 mg, Oral, Q6H PRN  polyethylene glycol (GLYCOLAX) packet 17 g, 17 g, Oral, Daily PRN  traZODone (DESYREL) tablet 50 mg, 50 mg, Oral, Nightly PRN  hydrOXYzine (ATARAX) tablet 50 mg, 50 mg, Oral, TID PRN  influenza quadrivalent split vaccine (FLUZONE;FLUARIX;FLULAVAL;AFLURIA) injection 0.5 mL, 0.5 mL, IntraMUSCular, Prior to discharge  divalproex (DEPAKOTE ER) extended release tablet 500 mg, 500 mg, Oral, Daily  sertraline (ZOLOFT) tablet 100 mg, 100 mg, Oral, Daily    ASSESSMENT  Intermittent explosive disorder in adult     PLAN  Patient's symptoms are improving  Continue with current medications. Attempt to develop insight  Psycho-education conducted. Supportive Therapy conducted.
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
## 2021-10-08 NOTE — PLAN OF CARE
Patient has attended 2 groups so far today and has been out of her room this morning so she has met her socialization goal for this shift.

## 2021-10-09 PROCEDURE — G0378 HOSPITAL OBSERVATION PER HR: HCPCS

## 2021-10-09 PROCEDURE — 1240000000 HC EMOTIONAL WELLNESS R&B

## 2021-10-09 PROCEDURE — 6370000000 HC RX 637 (ALT 250 FOR IP): Performed by: PSYCHIATRY & NEUROLOGY

## 2021-10-09 RX ADMIN — TRAZODONE HYDROCHLORIDE 50 MG: 50 TABLET ORAL at 21:13

## 2021-10-09 RX ADMIN — HYDROXYZINE HYDROCHLORIDE 50 MG: 25 TABLET, FILM COATED ORAL at 21:13

## 2021-10-09 RX ADMIN — DIVALPROEX SODIUM 500 MG: 500 TABLET, EXTENDED RELEASE ORAL at 08:32

## 2021-10-09 RX ADMIN — SERTRALINE 100 MG: 100 TABLET, FILM COATED ORAL at 08:32

## 2021-10-09 RX ADMIN — HYDROXYZINE HYDROCHLORIDE 50 MG: 25 TABLET, FILM COATED ORAL at 08:32

## 2021-10-09 ASSESSMENT — PAIN SCALES - GENERAL
PAINLEVEL_OUTOF10: 0
PAINLEVEL_OUTOF10: 0

## 2021-10-09 NOTE — GROUP NOTE
Group Therapy Note    Date: 10/9/2021    Group Start Time: 0915  Group End Time: 1769  Group Topic: Psychotherapy    [facility] 4E    Valentine Apley, LSW        Group Therapy Note    Attendees: 7         Patient's Goal:  Discuss with peers ways in which to maintain stability and prioritize mental health. Notes:  Patient identified the importance of medication compliance and consistency with therapy. Patient contributes to group conversation in a positive manner and shares that she is looking forward to beginning therapy and soon being able to see her family. Status After Intervention:  Improved    Participation Level:  Active Listener and Interactive    Participation Quality: Appropriate, Attentive, Sharing and Supportive      Speech:  normal      Thought Process/Content: Logical      Affective Functioning: Congruent      Mood: euthymic      Level of consciousness:  Alert, Oriented x4 and Attentive      Response to Learning: Able to verbalize current knowledge/experience, Able to verbalize/acknowledge new learning, Able to retain information, Capable of insight, Able to change behavior and Progressing to goal      Endings: None Reported    Modes of Intervention: Support and Socialization      Discipline Responsible: /Counselor      Signature:  Valentine Apley, LSW

## 2021-10-09 NOTE — SUICIDE SAFETY PLAN
SAFETY PLAN    A suicide Safety Plan is a document that supports someone when they are having thoughts of suicide. Warning Signs that indicate a suicidal crisis may be developing: What (situations, thoughts, feelings, body sensations, behaviors, etc.) do you experience that lets you know you are beginning to think about suicide? 1. I have never thought about suicide or harming myself in any way  2.   3. Internal Coping Strategies:  What things can I do (relaxation techniques, hobbies, physical activities, etc.) to take my mind off my problems without contacting another person? 1. Reading  2. journaling  3. Walking/running    People and social settings that provide distraction: Who can I call or where can I go to distract me? 1. Name: Cheryll Leventhal  Phone:   2. Name: Mac Johnson  Phone:    3. Place: [address] whom I can ask for help: Who can I call when I need help - for example, friends, family, clergy, someone else? 1. Name: Guero Aguiar                Phone:   2. Name: Dee Dee Roque  Phone:   3. Name: Sigrid  Phone:     Professionals or [email or web address] agencies I can contact during a crisis: Who can I call for help - for example, my doctor, my psychiatrist, my psychologist, a mental health provider, a suicide hotline? 1. Clinician Name: [facility]   Phone:       Clinician Pager or Emergency Contact #:     2. Clinician Name: [facility] crisis center   Phone:       Clinician Pager or Emergency Contact #:     3. Suicide Prevention Lifeline: 2-686-189-TALK (1462)    4. [address] [place] [address] Emergency Services -  for example, [facility] suicide hotline, [facility] Hotline:       Emergency Services Address:       Emergency Services Phone:     Making the environment safe: How can I make my environment (house/apartment/living space) safer? For example, can I remove guns, medications, and other items? 1. Remove alcohol  2.  Stay on medication

## 2021-10-09 NOTE — PROGRESS NOTES
Daily Progress Note  Art Sanchez MD  10/9/2021    Reviewed patient's current plan of care and vital signs with nursing staff. Sleep: 7.5 hours last night broken  Attending groups: Yes  No reported Suicidal thought; good interaction with peers & staff; no mood swings. Mood 6-7 on a scale of 1 to 10 with 10 is feeling normal.    SUBJECTIVE:    Patient is feeling better. SUICIDAL IDEATION denies suicidal ideation. Patient does not have medication side effects. ROS: Patient has new complaints:  No  Sleeping adequately:  Yes  Visitors: Yes    Mental Status Examination:  Patient is cooperative. Speech: Normal rate and tone. No abnormal movements, tics or mannerisms. Mood dysthymic; affect normal affect. Suicidal ideation Absent. Homicidal ideations Absent. Hallucinations Absent. Delusions Absent. Thought Content: normal. Thought Processes: Goal-Directed. Alert and oriented X 3. Attention and concentration fair. MEMORY intact. Insight and Judgement fair. Data   height is 5' 2\" (1.575 m) and weight is 130 lb (59 kg). Her tympanic temperature is 99.6 °F (37.6 °C). Her blood pressure is 133/80 and her pulse is 89. Her respiration is 17 and oxygen saturation is 97%.    Labs:            Medications  Current Facility-Administered Medications: acetaminophen (TYLENOL) tablet 650 mg, 650 mg, Oral, Q4H PRN  ibuprofen (ADVIL;MOTRIN) tablet 400 mg, 400 mg, Oral, Q6H PRN  polyethylene glycol (GLYCOLAX) packet 17 g, 17 g, Oral, Daily PRN  traZODone (DESYREL) tablet 50 mg, 50 mg, Oral, Nightly PRN  hydrOXYzine (ATARAX) tablet 50 mg, 50 mg, Oral, TID PRN  influenza quadrivalent split vaccine (FLUZONE;FLUARIX;FLULAVAL;AFLURIA) injection 0.5 mL, 0.5 mL, IntraMUSCular, Prior to discharge  divalproex (DEPAKOTE ER) extended release tablet 500 mg, 500 mg, Oral, Daily  sertraline (ZOLOFT) tablet 100 mg, 100 mg, Oral, Daily    ASSESSMENT  Intermittent explosive disorder in adult     PLAN  Patient's symptoms are improving  Continue with current medications. Attempt to develop insight  Psycho-education conducted. Supportive Therapy conducted.    Probable discharge is tomorrow  Follow-up @ [facility]

## 2021-10-09 NOTE — PLAN OF CARE
Problem: Pain:  Goal: Pain level will decrease  Description: Pain level will decrease  10/9/2021 0232 by Nina Rivera RN  Outcome: Ongoing  Note: Pt denies pain or discomfort  10/8/2021 1244 by Evangelina Kimbrough RN  Outcome: Met This Shift  Note: Pt had no C/O pain at time of assessment, will continue to monitor     Problem: Activity:  Goal: Sleeping patterns will improve  Description: Sleeping patterns will improve  10/9/2021 0232 by Nina Rivera RN  Outcome: Ongoing  Note: Pt resting quietly with no distress noted  10/8/2021 1244 by Evangelina Kimbrough RN  Outcome: Ongoing  Note: Patient slept 6.5 hours last night, reports they  slept well. Encouraged patient not to take naps during the day, relax several hours before bed and to take prescribed sleep meds as ordered. Patient  verbalized understanding. Problem: Altered Mood, Depressive Behavior:  Goal: Able to verbalize and/or display a decrease in depressive symptoms  Description: Able to verbalize and/or display a decrease in depressive symptoms  10/9/2021 0232 by Nina Rivera RN  Outcome: Ongoing  Note: Pt states some anxiety and depression, learning coping skills and making plans to attend meditation classes after discharge  10/8/2021 1244 by Evangelina Kimbrough RN  Outcome: Ongoing  Note: Pt had good eye contact, is flat, blunt, brightens and is participating in groups  Goal: Ability to disclose and discuss suicidal ideas will improve  Description: Ability to disclose and discuss suicidal ideas will improve  10/9/2021 0232 by Nina Rivera RN  Outcome: Ongoing  Note: Pt denies self harm or suicidal thoughts  10/8/2021 1244 by Evangelina Kimbrough RN  Outcome: Met This Shift  Note: Patient denies suicidal ideations, no plan or intent to harm self. Patient remains on 15 checks for safety. Instructed to seek staff as needed for thoughts of self harm.    Goal: Absence of self-harm  Description: Absence of self-harm  10/9/2021 0232 by Nina Rivera RN  Outcome: Ongoing  Note: Pt denies self harm or suicidal or self harm thoughts  10/8/2021 1244 by Serge Mercado RN  Outcome: Met This Shift  Note: No self harm behaviors were observed or reported so far this shift. Remains on every 15 minutes precautions for safety. Goal: Patient specific goal  Description: Patient specific goal  10/9/2021 0232 by James Cedillo RN  Outcome: Ongoing  Note: Pt working on goal to control anxiety  10/8/2021 1244 by Serge Mercado RN  Outcome: Ongoing  Note: To try to not be so anxious - ongoing  Goal: Participates in care planning  Description: Participates in care planning  10/9/2021 0232 by James Cedillo RN  Outcome: Ongoing  Note: Pt is taking medications, attending and participating in groups and care planning.  Pt has good interaction with staff and peers   10/8/2021 0484 [id number] 29 [id number] by Serge Mercado RN  Outcome: Met This Shift  Note: This patient participates in care planning      Problem: Substance Abuse:  Goal: Absence of drug withdrawal signs and symptoms  Description: Absence of drug withdrawal signs and symptoms  10/9/2021 0232 by James Cedillo RN  Outcome: Ongoing  Note: Pt denies withdrawals symptoms  10/8/2021 1244 by Serge Mercado RN  Outcome: Met This Shift  Note: No S/S of withdrawal noted, will continue to monitor  Goal: Patient specific goal  Description: Patient specific goal  10/9/2021 0232 by James Cedillo RN  Outcome: Ongoing  Note: Pt working on goal to control anxiety  10/8/2021 1244 by Serge Mercado RN  Outcome: Ongoing  Note: To try to not be so anxious - ongoing     Problem: Coping:  Goal: Ability to identify problematic behaviors that deter socialization will improve  Description: Ability to identify problematic behaviors that deter socialization will improve  10/9/2021 0232 by James Cedillo RN  Outcome: Ongoing  Note: Pt out on unit with good interaction with peers and staff  10/8/2021 1244 by Serge Mercado RN  Outcome: Ongoing     Problem: Anxiety:  Goal: Level of anxiety will decrease  Description: Level of anxiety will decrease  10/9/2021 0232 by Wei Kincaid RN  Outcome: Not Met This Shift  Note: Pt states some anxiety and depression, learning coping skills and making plans to attend meditation classes after discharge  10/8/2021 1244 by Matthew Cain RN  Outcome: Ongoing  Note: Pt had little C/O anxiety at time of assessment, will continue to monitor   Care plan reviewed with patient and verbalize understanding of the plan of care and contribute to goal setting.

## 2021-10-09 NOTE — BH NOTE
Group Therapy Note    Date: 10/9/2021  Start Time: 2000  End Time:  2021  Number of Participants: 1    Type of Group: Wrap-Up    Wellness Binder Information  Module Name:    Session Number:      Patient's Goal:  To be less anxious    Notes: Working on goal    Status After Intervention:  Improved    Participation Level: Active Listener    Participation Quality: Appropriate      Speech:  normal      Thought Process/Content: Logical      Affective Functioning: Congruent      Mood: anxious      Level of consciousness:  Alert      Response to Learning: Able to verbalize current knowledge/experience      Endings: None Reported    Modes of Intervention: Education      Discipline Responsible: Registered Nurse      Signature:   Hernandez Christine RN

## 2021-10-09 NOTE — PATIENT CARE CONFERENCE
[address] [place] [address]  Day 3 Interdisciplinary Treatment Plan NOTE    Review Date & Time: 10/9/2021 0918    Patient was in treatment team    Admission Type:   Admission Type: Voluntary    Reason for admission:  Reason for Admission: Depression and anxiety  Estimated Length of Stay Update:  3-5 days  Estimated Discharge Date Update: 3-5 days    PATIENT STRENGTHS:  Patient Strengths Strengths: Communication  Patient Strengths and Limitations:Limitations: General negative or hopeless attitude about future/recovery  Addictive Behavior:Addictive Behavior  In the past 3 months, have you felt or has someone told you that you have a problem with:  : Excessive Fluid intake  Do you have a history of Chemical Use?: No  Do you have a history of Alcohol Use?: Yes  Do you have a history of Street Drug Abuse?: Yes  Histroy of Prescripton Drug Abuse?: No  Medical Problems:  Past Medical History:   Diagnosis Date    Psychiatric problem        Risk:  Fall RiskTotal: 73  Boy Scale Boy Scale Score: 22  BVC    Change in scores no. Changes to plan of Care no    Status EXAM:   Status and Exam  Normal: No  Facial Expression: Flat, Brightened  Affect: Appropriate  Level of Consciousness: Alert  Mood:Normal: No  Mood: Anxious, Euphoric  Motor Activity:Normal: Yes  Interview Behavior: Cooperative  Preception: [place] to Person, [place] [facility] to Time, [place] to Place  Attention:Normal: Yes  Thought Processes: Circumstantial  Thought Content:Normal: Yes  Hallucinations: None  Delusions: No  Delusions:  Other(See Comment) (Paranoid feels she has to constantly look over her shoulder)  Memory:Normal: No  Memory: Poor Recent  Insight and Judgment: No  Insight and Judgment: Poor Judgment  Present Suicidal Ideation: No  Present Homicidal Ideation: No    Daily Assessment Last Entry:   Daily Sleep (WDL): Within Defined Limits         Patient Currently in Pain: Denies  Daily Nutrition (WDL): Within Defined Limits  Appetite Change: Decreased  Barriers to Nutrition: None  Level of Assistance: Independent/Self    Patient Monitoring:  Frequency of Checks: 4 times per hour, close    Psychiatric Symptoms:   Depression Symptoms  Depression Symptoms: Change in energy level  Anxiety Symptoms  Anxiety Symptoms: Generalized  Jeaneth Symptoms  Jeaneth Symptoms: No problems reported or observed. Psychosis Symptoms  Delusion Type: No problems reported or observed. Suicide Risk CSSR-S:  1) Within the past month, have you wished you were dead or wished you could go to sleep and not wake up? : No  2) Have you actually had any thoughts of killing yourself? : No  6) Have you ever done anything, started to do anything, or prepared to do anything to end your life?: No  Change in Result no Change in Plan of care no      EDUCATION:   Learner Progress Toward Treatment Goals: Reviewed results and recommendations of this team, Reviewed group plan and strategies and Reviewed goals and plan of care    Method: Individual    Outcome: Verbalized understanding, Demonstrated Understanding and Needs reinforcement    PATIENT GOALS: Get needed treatment to be better for family    PLAN/TREATMENT RECOMMENDATIONS UPDATE:  1. How are you progressing toward meeting your main treatment goal?   Patient is connecting with Pete Vega and looking forward to discharge  2. Are there discharge barriers/lingering problems that need to be addressed? Denies       3. Do you have the ability to pay for your medications? [facility] Medicaid      4. How is your group participation?      Patient has attended and participated in most groups    GOALS UPDATE:   Time frame for Short-Term Goals: Daily      SCOT Bonilla

## 2021-10-09 NOTE — PLAN OF CARE
Problem: Pain:  Goal: Pain level will decrease  Description: Pain level will decrease  10/9/2021 1235 by Madeline Coley LPN  Outcome: Met This Shift  Note: Patient denies pain  10/9/2021 0232 by Tony Whitt RN  Outcome: Ongoing  Note: Pt denies pain or discomfort     Problem: Anxiety:  Goal: Level of anxiety will decrease  Description: Level of anxiety will decrease  10/9/2021 1235 by Madeline Coley LPN  Outcome: Ongoing  Note: Medication given for anxiety, see MAR  10/9/2021 0232 by Tony Whitt RN  Outcome: Not Met This Shift  Note: Pt states some anxiety and depression, learning coping skills and making plans to attend meditation classes after discharge     Problem:  Activity:  Goal: Sleeping patterns will improve  Description: Sleeping patterns will improve  10/9/2021 1235 by Madeline Coley LPN  Outcome: Ongoing  Note: Patient slept 7.5 hours during the night, will continue to monitor  10/9/2021 0232 by Tony Whitt RN  Outcome: Ongoing  Note: Pt resting quietly with no distress noted     Problem: Altered Mood, Depressive Behavior:  Goal: Able to verbalize and/or display a decrease in depressive symptoms  Description: Able to verbalize and/or display a decrease in depressive symptoms  10/9/2021 1235 by Madeline Coley LPN  Outcome: Ongoing  Note: Patient verbmehrdad having little depressive symptoms, rates mood 6-7, has flat affect and brightens, will monitor  10/9/2021 0232 by Tony Whitt RN  Outcome: Ongoing  Note: Pt states some anxiety and depression, learning coping skills and making plans to attend meditation classes after discharge  Goal: Ability to disclose and discuss suicidal ideas will improve  Description: Ability to disclose and discuss suicidal ideas will improve  10/9/2021 1235 by Madeline Coley LPN  Outcome: Met This Shift  Note: Denies suicidal ideations  10/9/2021 0232 by Tony Whitt RN  Outcome: Ongoing  Note: Pt denies self harm or suicidal thoughts  Goal: Absence of self-harm  Description: Absence of self-harm  10/9/2021 1235 by Heaven Murrell LPN  Outcome: Met This Shift  Note: No self harm  10/9/2021 0232 by Alex Shea RN  Outcome: Ongoing  Note: Pt denies self harm or suicidal or self harm thoughts  Goal: Patient specific goal  Description: Patient specific goal  10/9/2021 1235 by Heaven Murrell LPN  Outcome: Ongoing  Note: Goal:  go home tomorrow  10/9/2021 0232 by Alex Shea RN  Outcome: Ongoing  Note: Pt working on goal to control anxiety  Goal: Participates in care planning  Description: Participates in care planning  10/9/2021 1235 by Heaven Murrell LPN  Outcome: Met This Shift  Note: Participates in care planning  10/9/2021 0232 by Alex Shea RN  Outcome: Ongoing  Note: Pt is taking medications, attending and participating in groups and care planning. Pt has good interaction with staff and peers      Problem: Substance Abuse:  Goal: Absence of drug withdrawal signs and symptoms  Description: Absence of drug withdrawal signs and symptoms  10/9/2021 1235 by Heaven Murrell LPN  Outcome: Met This Shift  Note: No s/s of drug withdrawal  10/9/2021 0232 by Alex Shea RN  Outcome: Ongoing  Note: Pt denies withdrawals symptoms  Goal: Patient specific goal  Description: Patient specific goal  10/9/2021 1235 by Heaven Murrell LPN  Outcome: Ongoing  Note: Goal;  to go home tomorrow  10/9/2021 0232 by Alex Shea RN  Outcome: Ongoing  Note: Pt working on goal to control anxiety     Problem: Coping:  Goal: Ability to identify problematic behaviors that deter socialization will improve  Description: Ability to identify problematic behaviors that deter socialization will improve  10/9/2021 1235 by Heaven Murrell LPN  Outcome: Met This Shift  Note: Patient out socializing with peers  10/9/2021 0232 by Alex Shea RN  Outcome: Ongoing  Note: Pt out on unit with good interaction with peers and staff    Care plan reviewed with patient .   Patient verbalizes understanding of the plan of care and contributes to goal setting.

## 2021-10-10 VITALS
DIASTOLIC BLOOD PRESSURE: 66 MMHG | HEART RATE: 84 BPM | BODY MASS INDEX: 23.92 KG/M2 | SYSTOLIC BLOOD PRESSURE: 126 MMHG | OXYGEN SATURATION: 98 % | TEMPERATURE: 98.5 F | RESPIRATION RATE: 16 BRPM | WEIGHT: 130 LBS | HEIGHT: 62 IN

## 2021-10-10 LAB — VALPROIC ACID LEVEL: 51.7 UG/ML (ref 50–100)

## 2021-10-10 PROCEDURE — 80164 ASSAY DIPROPYLACETIC ACD TOT: CPT

## 2021-10-10 PROCEDURE — G0378 HOSPITAL OBSERVATION PER HR: HCPCS

## 2021-10-10 PROCEDURE — 6370000000 HC RX 637 (ALT 250 FOR IP): Performed by: PSYCHIATRY & NEUROLOGY

## 2021-10-10 PROCEDURE — 36415 COLL VENOUS BLD VENIPUNCTURE: CPT

## 2021-10-10 RX ORDER — DIVALPROEX SODIUM 500 MG/1
500 TABLET, EXTENDED RELEASE ORAL DAILY
Qty: 30 TABLET | Refills: 0 | Status: SHIPPED | OUTPATIENT
Start: 2021-10-11

## 2021-10-10 RX ORDER — SERTRALINE HYDROCHLORIDE 100 MG/1
100 TABLET, FILM COATED ORAL DAILY
Qty: 30 TABLET | Refills: 0 | Status: SHIPPED | OUTPATIENT
Start: 2021-10-11

## 2021-10-10 RX ORDER — TRAZODONE HYDROCHLORIDE 50 MG/1
50 TABLET ORAL NIGHTLY PRN
Qty: 30 TABLET | Refills: 0 | Status: SHIPPED | OUTPATIENT
Start: 2021-10-10 | End: 2021-11-09

## 2021-10-10 RX ORDER — HYDROXYZINE 50 MG/1
50 TABLET, FILM COATED ORAL 3 TIMES DAILY PRN
Qty: 90 TABLET | Refills: 0 | Status: SHIPPED | OUTPATIENT
Start: 2021-10-10 | End: 2021-11-09

## 2021-10-10 RX ADMIN — DIVALPROEX SODIUM 500 MG: 500 TABLET, EXTENDED RELEASE ORAL at 08:00

## 2021-10-10 RX ADMIN — SERTRALINE 100 MG: 100 TABLET, FILM COATED ORAL at 08:00

## 2021-10-10 ASSESSMENT — PAIN SCALES - GENERAL: PAINLEVEL_OUTOF10: 0

## 2021-10-10 ASSESSMENT — PAIN SCALES - WONG BAKER: WONGBAKER_NUMERICALRESPONSE: 0

## 2021-10-10 NOTE — PROGRESS NOTES
Group Therapy Note    Date: 10/09/2021  Start Time: 2000  End Time:  2020  Number of Participants:     Type of Group: Wrap-Up    Wellness Binder Information  Module Name:    Session Number:      Patient's Goal:  To go home tomorrow     Notes: Working towards goal     Status After Intervention:  Unchanged    Participation Level:  Active Listener and Interactive    Participation Quality: Appropriate      Speech:  normal      Thought Process/Content: Logical      Affective Functioning: Congruent      Mood: anxious and depressed      Level of consciousness:  Alert      Response to Learning: Able to verbalize current knowledge/experience, Able to verbalize/acknowledge new learning and Able to retain information      Endings: None Reported    Modes of Intervention: Support and Socialization      Discipline Responsible: Registered Nurse      Signature:  Navarro Lawson RN

## 2021-10-10 NOTE — PROGRESS NOTES
Daily Progress Note  Jess Jones MD  10/10/2021    Reviewed patient's current plan of care and vital signs with nursing staff. Sleep: 8.5 hours last night broken  Attending groups: Yes  No reported Suicidal thought; good interaction with peers & staff; no mood swings. Mood 7 on a scale of 1 to 10 with 10 is feeling normal.    SUBJECTIVE:    Patient is feeling better. SUICIDAL IDEATION denies suicidal ideation. Patient does not have medication side effects. ROS: Patient has new complaints:  No  Sleeping adequately:  Yes  Visitors: Yes    Mental Status Examination:  Patient is cooperative. Speech: Normal rate and tone. No abnormal movements, tics or mannerisms. Mood dysthymic; affect normal affect. Suicidal ideation Absent. Homicidal ideations Absent. Hallucinations Absent. Delusions Absent. Thought Content: normal. Thought Processes: Goal-Directed. Alert and oriented X 3. Attention and concentration fair. MEMORY intact. Insight and Judgement fair. Data   height is 5' 2\" (1.575 m) and weight is 130 lb (59 kg). Her tympanic temperature is 98.5 °F (36.9 °C). Her blood pressure is 126/66 and her pulse is 84. Her respiration is 16 and oxygen saturation is 98%. Labs:   Results for Dian Pardo (MRN [id number]) as of 10/10/2021 11:33   Ref.  Range 10/10/2021 07:55   Valproic Acid Lvl Latest Ref Range: 50.0 - 100.0 ug/mL 51.7            Medications  Current Facility-Administered Medications: acetaminophen (TYLENOL) tablet 650 mg, 650 mg, Oral, Q4H PRN  ibuprofen (ADVIL;MOTRIN) tablet 400 mg, 400 mg, Oral, Q6H PRN  polyethylene glycol (GLYCOLAX) packet 17 g, 17 g, Oral, Daily PRN  traZODone (DESYREL) tablet 50 mg, 50 mg, Oral, Nightly PRN  hydrOXYzine (ATARAX) tablet 50 mg, 50 mg, Oral, TID PRN  influenza quadrivalent split vaccine (FLUZONE;FLUARIX;FLULAVAL;AFLURIA) injection 0.5 mL, 0.5 mL, IntraMUSCular, Prior to discharge  divalproex (DEPAKOTE ER) extended release tablet 500 mg, 500 mg, Oral, Daily  sertraline (ZOLOFT) tablet 100 mg, 100 mg, Oral, Daily    ASSESSMENT  Intermittent explosive disorder in adult     PLAN  Patient's symptoms are improving  Continue with current medications. Attempt to develop insight  Psycho-education conducted. Supportive Therapy conducted.    Probable discharge is today  Follow-up @ [facility]

## 2021-10-10 NOTE — GROUP NOTE
Group Therapy Note    Date: 10/10/2021    Group Start Time: 0915  Group End Time: 0935  Group Topic: Community Meeting    [facility]    Keny Brown MA, CTRS    Group Therapy Note    Attendees: 5       Patient's Goal:  \"to go home today and hug my kids. \"    Notes:  Pt progress is ongoing. Status After Intervention:  Improved    Participation Level:  Active Listener and Interactive    Participation Quality: Appropriate, Attentive, Sharing and Supportive      Speech:  normal      Thought Process/Content: Logical      Affective Functioning: Congruent      Mood: euthymic      Level of consciousness:  Alert, Oriented x4 and Attentive      Response to Learning: Able to verbalize current knowledge/experience, Able to verbalize/acknowledge new learning, Able to retain information, Capable of insight, Able to change behavior and Progressing to goal      Endings: None Reported    Modes of Intervention: Education, Support, Socialization, Exploration, Clarifying, Activity, Limit-setting and Reality-testing      Discipline Responsible: Psychoeducational Specialist      Signature:  Keny Brown MA, CTRS

## 2021-10-10 NOTE — GROUP NOTE
Group Therapy Note    Date: 10/10/2021    Group Start Time: 1030  Group End Time: 1100  Group Topic: Psychotherapy    [facility]    SCOT Rodriguez        Group Therapy Note    Attendees: 5         Patient's Goal:  Identify and discuss with peers findings of self-reflection and time on the unit. Share desired change, supports and plans for change. Notes:  Patient contributed to group conversation in a positive manner. Patient shares the importance of family to her. Patient explains that she would like to work on communicating with others including family members as it will be beneficial to her mental health. Status After Intervention:  Improved    Participation Level:  Active Listener and Interactive    Participation Quality: Appropriate, Attentive, Sharing and Supportive      Speech:  normal      Thought Process/Content: Logical      Affective Functioning: Congruent      Mood: euthymic      Level of consciousness:  Alert, Oriented x4 and Attentive      Response to Learning: Able to verbalize current knowledge/experience, Able to verbalize/acknowledge new learning, Able to retain information, Capable of insight, Able to change behavior and Progressing to goal      Endings: None Reported    Modes of Intervention: Support and Socialization      Discipline Responsible: /Counselor      Signature:  SCOT Rodriguez

## 2021-10-10 NOTE — GROUP NOTE
Group Therapy Note    Date: 10/10/2021    Group Start Time: 0935  Group End Time: 1005  Group Topic: Recreational    [facility]    Ashly Restrepo MA, CTRS    Group Therapy Note    Attendees: 5         Patient's Goal:  Pt improved socialization and knowledge of coping skills. Notes:  Pt participated in mindfulness group. Pt discussed with peers how to be present in the moment, focusing on positive thoughts and how negative thoughts can affect you mentally. Pt displayed active participation and was interactive with peers. Status After Intervention:  Improved    Participation Level:  Active Listener and Interactive    Participation Quality: Appropriate, Attentive, Sharing and Supportive      Speech:  normal      Thought Process/Content: Logical      Affective Functioning: Congruent      Mood: euthymic      Level of consciousness:  Alert, Oriented x4 and Attentive      Response to Learning: Able to verbalize current knowledge/experience, Able to verbalize/acknowledge new learning, Able to retain information, Capable of insight, Able to change behavior and Progressing to goal      Endings: None Reported    Modes of Intervention: Education, Support, Socialization, Exploration, Clarifying, Activity, Limit-setting and Reality-testing      Discipline Responsible: Psychoeducational Specialist      Signature:  Ashly Restrepo MA, CTRS
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
## 2021-10-14 ENCOUNTER — TELEPHONE (OUTPATIENT)
Dept: PSYCHIATRY | Age: 41
End: 2021-10-14

## 2021-10-14 NOTE — TELEPHONE ENCOUNTER
Post discharge telephone call and patient was contacted. Very complimentary of program and service received. Had no concerns or problems with discharge process.

## 2023-02-06 ENCOUNTER — HOSPITAL ENCOUNTER (EMERGENCY)
Age: 43
Discharge: HOME | End: 2023-02-06
Payer: MEDICAID

## 2023-02-06 VITALS
SYSTOLIC BLOOD PRESSURE: 117 MMHG | HEART RATE: 104 BPM | WEIGHT: 120 LBS | RESPIRATION RATE: 20 BRPM | OXYGEN SATURATION: 96 % | BODY MASS INDEX: 22.08 KG/M2 | HEIGHT: 62 IN | DIASTOLIC BLOOD PRESSURE: 68 MMHG | TEMPERATURE: 97.7 F

## 2023-02-06 DIAGNOSIS — H10.9 CONJUNCTIVITIS OF LEFT EYE, UNSPECIFIED CONJUNCTIVITIS TYPE: Primary | ICD-10-CM

## 2023-02-06 PROCEDURE — 99283 EMERGENCY DEPT VISIT LOW MDM: CPT

## 2023-02-06 RX ORDER — CARBOXYMETHYLCELLULOSE SODIUM 10 MG/ML
2 GEL OPHTHALMIC 4 TIMES DAILY PRN
Qty: 1 EACH | Refills: 0 | Status: SHIPPED | OUTPATIENT
Start: 2023-02-06

## 2023-02-06 RX ORDER — ERYTHROMYCIN 5 MG/G
OINTMENT OPHTHALMIC
Qty: 3.5 G | Refills: 0 | Status: SHIPPED | OUTPATIENT
Start: 2023-02-06

## 2023-02-06 ASSESSMENT — PAIN - FUNCTIONAL ASSESSMENT: PAIN_FUNCTIONAL_ASSESSMENT: 0-10

## 2023-02-06 ASSESSMENT — PAIN SCALES - GENERAL: PAINLEVEL_OUTOF10: 5

## 2023-02-06 NOTE — Clinical Note
Yong Torres was seen and treated in our emergency department on 2/6/2023. She may return to work on 02/07/2023. If you have any questions or concerns, please don't hesitate to call.       Delfino Ormond, PA-C

## 2023-02-06 NOTE — ED TRIAGE NOTES
Pt comes to ED for conjunctivitis in left eye. Pt states she states that her left eye is red . PT rates her eye pain a 5 out of 10. Pt states there is still drainage coming from her eye. Pt states she was given polymyxin for her infection. PT states her eye is sensitive to light at this time. Pt states she is able to see out of it and does not have blurry vision.

## 2023-02-06 NOTE — ED PROVIDER NOTES
[address] [facility]\"" [address] [address] EMERGENCY DEPT      EMERGENCY MEDICINE     Pt Name: Brigitte Belle  MRN: [id number]  [place] 1980  Date of evaluation: 2/6/2023  Provider: Kushal Bass PA-C    CHIEF COMPLAINT       Chief Complaint   Patient presents with    Conjunctivitis     left     HISTORY OF PRESENT ILLNESS   Brigitte Belle is a pleasant 40 y.o. female who presents to the emergency department from from home, as a walk in to the ED lobby for evaluation of conjunctivitis of the left eye. Patient states this started a few days ago and started typical of pinkeye with a little bit of redness and discharge/crusting with wakening. She was seen at [facility] and started on polymyxin, has been compliant with using this, 1 drop to the left eye 4 times a day for the past 5 days. States that yesterday morning there was more swelling and redness of the eye. She continues to have 5/10 pain and has been treating with cold compresses, ibuprofen, warm towels. She had no known injury prior to the onset of symptoms but states she does have a history of scratching herself in her sleep and wonders whether she may have done this. Reports that the drainage during the day is very watery, like tears but she does have crusting when waking yet. She does not wear contacts and denies any vision changes, associated URI symptoms, fever or periorbital pain. She does not have an eye doctor. PASTMEDICAL HISTORY     Past Medical History:   Diagnosis Date    Psychiatric problem        Patient Active Problem List   Diagnosis Code    Pyelonephritis affecting pregnancy in second trimester O23.02    Fetal growth restriction [id number]    Depressive disorder F32. A    Intermittent explosive disorder in adult F63.81    Panic disorder without agoraphobia F41.0    Alcohol use disorder, moderate, dependence (HCC) F10.20    Cannabis use disorder, moderate, dependence (HCC) F12.20    Intermittent explosive disorder F63.81     SURGICAL HISTORY       Past Surgical History:   Procedure Laterality Date    HYSTERECTOMY (CERVIX STATUS UNKNOWN)         CURRENT MEDICATIONS       Discharge Medication List as of 2023  6:50 AM        CONTINUE these medications which have NOT CHANGED    Details   divalproex (DEPAKOTE ER) 500 MG extended release tablet Take 1 tablet by mouth daily, Disp-30 tablet, R-0Normal      sertraline (ZOLOFT) 100 MG tablet Take 1 tablet by mouth daily, Disp-30 tablet, R-0Normal      traZODone (DESYREL) 50 MG tablet Take 1 tablet by mouth nightly as needed for Sleep, Disp-30 tablet, R-0Normal             ALLERGIES     has No Known Allergies. FAMILY HISTORY     She indicated that her mother is alive. She indicated that her father is . She indicated that her sister is alive. She indicated that her brother is alive. SOCIAL HISTORY       Social History     Tobacco Use    Smoking status: Every Day     Packs/day: 0.50     Years: 10.00     Pack years: 5.00     Types: Cigarettes     Start date: 10/7/2011    Smokeless tobacco: Never   Vaping Use    Vaping Use: Never used   Substance Use Topics    Alcohol use: No    Drug use: Yes     Frequency: 3.0 times per week     Types: Marijuana (Weed)     Comment: Uses when she can afford it       PHYSICAL EXAM       ED Triage Vitals [23 0620]   BP Temp Temp Source Heart Rate Resp SpO2 Height Weight   117/68 97.7 °F (36.5 °C) Oral (!) 119 20 96 % 5' 2\" (1.575 m) 120 lb (54.4 kg)       Additional Vital Signs:  Vitals:    23 0654   BP:    Pulse: (!) 104   Resp:    Temp:    SpO2:      Physical Exam  Vitals and nursing note reviewed. Constitutional:       Appearance: Normal appearance. HENT:      Head: Normocephalic. Mouth/Throat:      Mouth: Mucous membranes are moist.   Eyes:      General: Lids are normal.      Extraocular Movements: Extraocular movements intact. Conjunctiva/sclera:      Right eye: Right conjunctiva is not injected. No chemosis or exudate.      Left eye: Left conjunctiva is injected. No exudate. Pupils: Pupils are equal, round, and reactive to light. Left eye: Pupil is round, reactive and not sluggish. Fluorescein uptake present. Slit lamp exam:     Left eye: Photophobia present. No corneal flare, corneal ulcer, foreign body or hyphema. Comments: Mild photophobia on examination but patient is able to keep eyes open with overhead light on. There is notable injection of the left conjunctive a with subconjunctival hemorrhage noted inferiorly. No hyphema or other sign of orbital trauma. No periorbital erythema, warmth, swelling. No swelling of the lid. No exudate noted though there is mild watering. Mild increase in injection at the limbic margin. Slight triangle of fluorescein uptake in the 9 o'clock position adjacent to but not overlying the iris. Cardiovascular:      Rate and Rhythm: Normal rate. Pulmonary:      Effort: Pulmonary effort is normal. No respiratory distress. Musculoskeletal:      Cervical back: Normal range of motion. Skin:     General: Skin is warm and dry. Neurological:      General: No focal deficit present. Mental Status: She is alert and oriented to person, place, and time. Psychiatric:         Mood and Affect: Mood normal.       FORMAL DIAGNOSTIC RESULTS     RADIOLOGY: Interpretation per the Radiologist below, if available at the time of this note (none if blank): No orders to display       LABS: (none if blank)  Labs Reviewed - No data to display    (Any cultures that may have been sent were not resulted at the time of this patient visit)    [facility] / ED COURSE:     1) Number and Complexity of Problems            Problem List This Visit:         Chief Complaint   Patient presents with    Conjunctivitis     left   Patient presents tachycardic with otherwise reassuring vital signs for complaints of worsening left eye symptoms.   Recent diagnosis of conjunctivitis and there is question as to perhaps a poorly demarcated abrasion in the 9 o'clock position on fluorescein examination. This does appear to be superficial and patient denied any known injury, question whether it may be secondary to an underlying conjunctivitis. She does not wear contacts. She appears grossly comfortable but mildly anxious and I suspect this is the cause of her tachycardia. We did discuss potential for iritis given some increase in the injection of the limbic margin but her exam does not appear to be overly specific for this. She does not have an established eye doctor and discussed following up with 1 for better clarification on this and for the persistence of her symptoms. Will change her from Polytrim to erythromycin ointment as she feels the eyedrops are washing out of her eye immediately. We will also give refresh gel drops for comfort and she can continue the compresses and ibuprofen. Close outpatient follow-up with ophthalmology advised and the patient was agreeable with this plan. Information for a local provider was given in discharge instructions and she denied further needs upon discharge. Work note provided. Differential Diagnosis includes (but not limited to): Conjunctivitis, corneal abrasion        Diagnoses Considered but I have low suspicion of:   Periorbital cellulitis, iritis             Pertinent Comorbid Conditions:    None    2)  Data Reviewed (none if left blank)          My Independent interpretations:     EKG:      None    Imaging: None    Labs:      None                 Decision Rules/Clinical Scores utilized: None            External Documentation Reviewed:         Previous patient encounter documents & history available on EMR was reviewed              See Formal Diagnostic Results above for the lab and radiology tests and orders.     3)  Treatment and Disposition         ED Reassessment: None         Case discussed with consulting clinician: No         Shared Decision-Making was performed and disposition discussed with the        Patient/Family and questions answered          Social determinants of health impacting treatment or disposition: None         Code Status: Full      Summary of Patient Presentation:      WANDER  /   Chelo Hyman Reviewed:    Vitals:    02/06/23 0620 02/06/23 0654   BP: 117/68    Pulse: (!) 119 (!) 104   Resp: 20    Temp: 97.7 °F (36.5 °C)    TempSrc: Oral    SpO2: 96%    Weight: 120 lb (54.4 kg)    Height: 5' 2\" (1.575 m)        The patient was seen and examined. Appropriate diagnostic testing was performed and results reviewed with the patient. The results of pertinent diagnostic studies and exam findings were discussed. The patients provisional diagnosis and plan of care were discussed with the patient and present family who expressed understanding. Any medications were reviewed and indications and risks of medications were discussed with the patient /family present. Strict verbal and written return precautions, instructions and appropriate follow-up provided to  the patient. ED Medications administered this visit:  (None if blank)  Medications - No data to display      PROCEDURES: (None if blank)  Procedures:     CRITICAL CARE: (None if blank)      DISCHARGE PRESCRIPTIONS: (None if blank)  Discharge Medication List as of 2/6/2023  6:50 AM        START taking these medications    Details   erythromycin (ROMYCIN) 5 MG/GM ophthalmic ointment Apply 1/2 inch to inner left eyelid QID x 7 days, Disp-3.5 g, R-0, Normal      carboxymethylcellulose PF (REFRESH CELLUVISC) 1 % GEL ophthalmic gel Place 2 drops into the left eye 4 times daily as needed for Dry Eyes, Disp-1 each, R-0Normal             FINAL IMPRESSION      1.  Conjunctivitis of left eye, unspecified conjunctivitis type          DISPOSITION/PLAN   DISPOSITION Decision To Discharge 02/06/2023 06:42:02 AM      OUTPATIENT FOLLOW UP THE PATIENT:  Pina Alvarado MD  [facility] [address]  [phone]    Call   As needed if symptoms worsening or no improvement    [facility] EMERGENCY DEPT  [address]    If symptoms worsen      TOMMY Burns, [place]  02/06/23 7119

## 2023-09-17 ENCOUNTER — HOSPITAL ENCOUNTER (EMERGENCY)
Age: 43
Discharge: HOME | End: 2023-09-17
Payer: MEDICAID

## 2023-09-17 VITALS
WEIGHT: 103 LBS | DIASTOLIC BLOOD PRESSURE: 72 MMHG | RESPIRATION RATE: 16 BRPM | HEART RATE: 97 BPM | SYSTOLIC BLOOD PRESSURE: 129 MMHG | OXYGEN SATURATION: 100 % | TEMPERATURE: 98.8 F | BODY MASS INDEX: 18.95 KG/M2 | HEIGHT: 62 IN

## 2023-09-17 DIAGNOSIS — L02.01 FACIAL ABSCESS: Primary | ICD-10-CM

## 2023-09-17 PROCEDURE — 99283 EMERGENCY DEPT VISIT LOW MDM: CPT

## 2023-09-17 RX ORDER — DOXYCYCLINE HYCLATE 100 MG
100 TABLET ORAL 2 TIMES DAILY
Qty: 20 TABLET | Refills: 0 | Status: SHIPPED | OUTPATIENT
Start: 2023-09-17 | End: 2023-09-27

## 2023-09-17 ASSESSMENT — PAIN SCALES - GENERAL: PAINLEVEL_OUTOF10: 3

## 2023-09-17 ASSESSMENT — PAIN DESCRIPTION - PAIN TYPE: TYPE: ACUTE PAIN

## 2023-09-17 ASSESSMENT — PAIN DESCRIPTION - LOCATION: LOCATION: HEAD

## 2023-09-17 ASSESSMENT — PAIN DESCRIPTION - DESCRIPTORS: DESCRIPTORS: ACHING

## 2023-09-17 ASSESSMENT — PAIN - FUNCTIONAL ASSESSMENT: PAIN_FUNCTIONAL_ASSESSMENT: 0-10

## 2023-09-17 NOTE — ED TRIAGE NOTES
Patient presents to ER with complaints of 2 facial abscesses on forehead that have been present for 1 week.

## 2023-09-18 NOTE — ED PROVIDER NOTES
[facility] EMERGENCY DEPT      EMERGENCY MEDICINE     Pt Name: Sandhya Hernandez  MRN: [id number]  [address] [facility] 1980  Date of evaluation: 9/17/2023  Provider: Florentin Sneed PA-C    CHIEF COMPLAINT       Chief Complaint   Patient presents with    Skin Problem     Facial abscess     HISTORY OF PRESENT ILLNESS   Sandhya Hernandez is a pleasant 44 y.o. female who presents to the emergency department from from home, as a walk in to the ED lobby for evaluation of abscesses. Patient states she has a history of recurrent abscesses on her ears. States that newly over the past week, she has had 2 abscesses present to the forehead. No known injury to this area prior to onset. She does believe they have increased in size since onset. Reports drainage of clear fluid or bloody fluid but no obvious purulence. She does complain of a frontal headache which began after the onset of the abscesses and is present primarily at the location of the abscesses. No preexistent sinus symptoms or infections. No fevers or chills associated. She has used aspirin and ibuprofen but no topical treatments. She has tried to pick at/manually express the areas but has not used any instrumentation. PASTMEDICAL HISTORY     Past Medical History:   Diagnosis Date    Psychiatric problem        Patient Active Problem List   Diagnosis Code    Pyelonephritis affecting pregnancy in second trimester O23.02    Fetal growth restriction [id number]    Depressive disorder F32. A    Intermittent explosive disorder in adult F63.81    Panic disorder without agoraphobia F41.0    Alcohol use disorder, moderate, dependence (HCC) F10.20    Cannabis use disorder, moderate, dependence (HCC) F12.20    Intermittent explosive disorder F63.81     SURGICAL HISTORY       Past Surgical History:   Procedure Laterality Date    HYSTERECTOMY (CERVIX STATUS UNKNOWN)         CURRENT MEDICATIONS       Discharge Medication List as of 9/17/2023 12:33 PM        CONTINUE these

## 2024-02-27 ENCOUNTER — HOSPITAL ENCOUNTER (EMERGENCY)
Age: 44
Discharge: HOME | End: 2024-02-27
Payer: MEDICAID

## 2024-02-27 VITALS
SYSTOLIC BLOOD PRESSURE: 94 MMHG | WEIGHT: 104.5 LBS | OXYGEN SATURATION: 97 % | HEIGHT: 62 IN | TEMPERATURE: 97.9 F | BODY MASS INDEX: 19.23 KG/M2 | DIASTOLIC BLOOD PRESSURE: 58 MMHG | HEART RATE: 94 BPM | RESPIRATION RATE: 16 BRPM

## 2024-02-27 DIAGNOSIS — B86 SCABIES: Primary | ICD-10-CM

## 2024-02-27 PROCEDURE — 99213 OFFICE O/P EST LOW 20 MIN: CPT

## 2024-02-27 PROCEDURE — 99203 OFFICE O/P NEW LOW 30 MIN: CPT | Performed by: NURSE PRACTITIONER

## 2024-02-27 RX ORDER — DIPHENHYDRAMINE HCL 25 MG
25 CAPSULE ORAL EVERY 6 HOURS PRN
Qty: 30 CAPSULE | Refills: 0 | Status: SHIPPED | OUTPATIENT
Start: 2024-02-27 | End: 2024-03-08

## 2024-02-27 RX ORDER — PERMETHRIN 50 MG/G
CREAM TOPICAL
Qty: 1 EACH | Refills: 1 | Status: SHIPPED | OUTPATIENT
Start: 2024-02-27

## 2024-02-27 RX ORDER — PREDNISONE 20 MG/1
20 TABLET ORAL 2 TIMES DAILY
Qty: 10 TABLET | Refills: 0 | Status: SHIPPED | OUTPATIENT
Start: 2024-02-27 | End: 2024-03-03

## 2024-02-27 ASSESSMENT — ENCOUNTER SYMPTOMS
APNEA: 0
CHOKING: 0
NAUSEA: 0
PERI-ORBITAL EDEMA: 0
SORE THROAT: 0
STRIDOR: 0
ABDOMINAL PAIN: 0
THROAT SWELLING: 0
VOMITING: 0
HOARSE VOICE: 0
DIARRHEA: 0
WHEEZING: 0
CHEST TIGHTNESS: 0
SHORTNESS OF BREATH: 0
COUGH: 0

## 2024-02-27 ASSESSMENT — PAIN - FUNCTIONAL ASSESSMENT: PAIN_FUNCTIONAL_ASSESSMENT: NONE - DENIES PAIN

## 2024-02-27 NOTE — ED PROVIDER NOTES
[facility] URGENT CARE  Urgent Care Encounter      CHIEF COMPLAINT       Chief Complaint   Patient presents with    Rash       Nurses Notes reviewed and I agree except as noted in the HPI.  HISTORY OFPRESENT ILLNESS   Delmy Siu is a 43 y.o.  The history is provided by the patient. No  was used.   Rash  Location:  Face, torso and pelvis  Facial rash location:  Face and forehead  Torso rash location:  R breast and L breast  Pelvic rash location:  R buttock and L buttock  Quality: itchiness, redness and scaling    Quality: not blistering, not bruising, not burning, not draining, not dry, not painful, not peeling, not swelling and not weeping    Severity:  Severe  Onset quality:  Gradual  Duration:  1 month  Timing:  Constant  Progression:  Worsening  Chronicity:  New  Context: not animal contact, not chemical exposure, not diapers, not eggs, not exposure to similar rash, not food, not hot tub use, not insect bite/sting, not medications, not new detergent/soap, not nuts, not plant contact, not pollen, not pregnancy, not sick contacts and not sun exposure    Relieved by:  Nothing  Worsened by:  Continued exposure to allergens, heat and moisture  Ineffective treatments:  Antihistamines  Associated symptoms: induration    Associated symptoms: no abdominal pain, no diarrhea, no fatigue, no fever, no headaches, no hoarse voice, no joint pain, no myalgias, no nausea, no periorbital edema, no shortness of breath, no sore throat, no throat swelling, no tongue swelling, no URI, not vomiting and not wheezing        REVIEW OF SYSTEMS     Review of Systems   Constitutional:  Negative for activity change, appetite change, chills, diaphoresis, fatigue and fever.   HENT:  Negative for hoarse voice and sore throat.    Respiratory:  Negative for apnea, cough, choking, chest tightness, shortness of breath, wheezing and stridor.    Cardiovascular:  Negative for chest pain, palpitations and leg  swelling.   Gastrointestinal:  Negative for abdominal pain, diarrhea, nausea and vomiting.   Musculoskeletal:  Negative for arthralgias and myalgias.   Skin:  Positive for rash.   Neurological:  Negative for headaches.       PAST MEDICAL HISTORY         Diagnosis Date    Psychiatric problem        SURGICAL HISTORY     Patient  has a past surgical history that includes Hysterectomy.    CURRENT MEDICATIONS       Discharge Medication List as of 2/27/2024  2:56 PM        CONTINUE these medications which have NOT CHANGED    Details   traZODone (DESYREL) 50 MG tablet Take 1 tablet by mouth nightly as needed for Sleep, Disp-30 tablet, R-0Normal             ALLERGIES     Patient is has No Known Allergies.    FAMILY HISTORY     Patient's family history includes Early Death in her father; Mental Illness in her father.    SOCIAL HISTORY     Patient  reports that she has been smoking cigarettes. She started smoking about 12 years ago. She has a 6.2 pack-year smoking history. She has never used smokeless tobacco. She reports current drug use. Frequency: 3.00 times per week. Drug: Marijuana (Weed). She reports that she does not drink alcohol.    PHYSICAL EXAM     ED TRIAGE VITALS  BP: (!) 94/58, Temp: 97.9 °F (36.6 °C), Pulse: 94, Respirations: 16, SpO2: 97 %  Physical Exam  Vitals and nursing note reviewed.   Constitutional:       General: She is awake. She is not in acute distress.     Appearance: Normal appearance. She is well-developed, well-groomed and normal weight. She is not ill-appearing, toxic-appearing or diaphoretic.   HENT:      Head: Normocephalic and atraumatic.      Right Ear: External ear normal.      Left Ear: External ear normal.   Eyes:      Extraocular Movements: Extraocular movements intact.      Conjunctiva/sclera: Conjunctivae normal.   Pulmonary:      Effort: Pulmonary effort is normal.   Musculoskeletal:         General: Normal range of motion.      Cervical back: Normal range of motion.   Skin:

## 2024-02-27 NOTE — DISCHARGE INSTRUCTIONS
Take Medication as Directed  Benadryl for itch, Don't scratch  Monitor for any increase in size or spreading  Monitor for fever or chills  Keep drainage covered if any.  Follow up with your PCP or return as needed  Or go to the emergency Department

## 2024-02-27 NOTE — ED TRIAGE NOTES
Patient ambulated to room with complaint of itchy red bumps that started a month ago and have spread on body.

## 2024-05-22 ENCOUNTER — HOSPITAL ENCOUNTER (EMERGENCY)
Age: 44
Discharge: HOME | End: 2024-05-22
Payer: MEDICAID

## 2024-05-22 VITALS
BODY MASS INDEX: 21.16 KG/M2 | HEART RATE: 99 BPM | OXYGEN SATURATION: 96 % | SYSTOLIC BLOOD PRESSURE: 115 MMHG | HEIGHT: 62 IN | TEMPERATURE: 98.6 F | DIASTOLIC BLOOD PRESSURE: 70 MMHG | WEIGHT: 115 LBS | RESPIRATION RATE: 16 BRPM

## 2024-05-22 DIAGNOSIS — B86 SCABIES: Primary | ICD-10-CM

## 2024-05-22 PROCEDURE — 99213 OFFICE O/P EST LOW 20 MIN: CPT

## 2024-05-22 RX ORDER — PERMETHRIN 50 MG/G
CREAM TOPICAL
Qty: 1 EACH | Refills: 1 | Status: SHIPPED | OUTPATIENT
Start: 2024-05-22 | End: 2024-05-22

## 2024-05-22 RX ORDER — PERMETHRIN 50 MG/G
CREAM TOPICAL
Qty: 2 EACH | Refills: 0 | Status: SHIPPED | OUTPATIENT
Start: 2024-05-22

## 2024-05-22 RX ORDER — HYDROXYZINE HYDROCHLORIDE 25 MG/1
25 TABLET, FILM COATED ORAL EVERY 8 HOURS PRN
Qty: 30 TABLET | Refills: 0 | Status: SHIPPED | OUTPATIENT
Start: 2024-05-22 | End: 2024-06-01

## 2024-05-22 ASSESSMENT — PAIN - FUNCTIONAL ASSESSMENT: PAIN_FUNCTIONAL_ASSESSMENT: NONE - DENIES PAIN

## 2024-05-22 NOTE — ED PROVIDER NOTES
[facility] URGENT CARE  Urgent Care Encounter       CHIEF COMPLAINT       Chief Complaint   Patient presents with    Rash     Bug bites        Nurses Notes reviewed and I agree except as noted in the HPI.  HISTORY OF PRESENT ILLNESS   Delmy Siu is a 43 y.o. female who presents with complaints of rash through out body. Reports she was here a few months ago for same thing. Reports it went away for the most part but got worse again a month ago. Pt reports washing all bedding. Pt denies anyone else in house being treated.     The history is provided by the patient.       REVIEW OF SYSTEMS     Review of Systems   Skin:  Positive for rash.   All other systems reviewed and are negative.      PAST MEDICAL HISTORY         Diagnosis Date    Psychiatric problem        SURGICALHISTORY     Patient  has a past surgical history that includes Hysterectomy.    CURRENT MEDICATIONS       Discharge Medication List as of 5/22/2024 12:31 PM        CONTINUE these medications which have NOT CHANGED    Details   traZODone (DESYREL) 50 MG tablet Take 1 tablet by mouth nightly as needed for Sleep, Disp-30 tablet, R-0Normal             ALLERGIES     Patient is has No Known Allergies.    Patients   Immunization History   Administered Date(s) Administered    Influenza Vaccine, unspecified formulation 11/02/2016    Pneumococcal, PCV-13, PREVNAR 13, (age 6w+), IM, 0.5mL 12/02/2016    TDaP, ADACEL (age 10y-64y), BOOSTRIX (age 10y+), IM, 0.5mL 10/24/2013, 04/19/2015       FAMILY HISTORY     Patient's family history includes Early Death in her father; Mental Illness in her father.    SOCIAL HISTORY     Patient  reports that she has been smoking cigarettes. She started smoking about 12 years ago. She has a 6.3 pack-year smoking history. She has never used smokeless tobacco. She reports current drug use. Frequency: 3.00 times per week. Drug: Marijuana (Weed). She reports that she does not drink alcohol.    PHYSICAL EXAM     ED TRIAGE

## 2024-05-22 NOTE — ED TRIAGE NOTES
Patient ambulated to room with complaint of itchy bumps that started a month ago. States she was seen at [facility] about a month ago and received medication but symptoms remain.

## 2025-03-13 ENCOUNTER — HOSPITAL ENCOUNTER (EMERGENCY)
Age: 45
Discharge: HOME | End: 2025-03-13
Payer: MEDICAID

## 2025-03-13 VITALS
TEMPERATURE: 99 F | DIASTOLIC BLOOD PRESSURE: 64 MMHG | HEIGHT: 62 IN | SYSTOLIC BLOOD PRESSURE: 115 MMHG | OXYGEN SATURATION: 96 % | WEIGHT: 120 LBS | RESPIRATION RATE: 18 BRPM | HEART RATE: 104 BPM | BODY MASS INDEX: 22.08 KG/M2

## 2025-03-13 DIAGNOSIS — L30.9 DERMATITIS: Primary | ICD-10-CM

## 2025-03-13 DIAGNOSIS — F17.210 CIGARETTE SMOKER: ICD-10-CM

## 2025-03-13 PROCEDURE — 99283 EMERGENCY DEPT VISIT LOW MDM: CPT

## 2025-03-13 RX ORDER — DOXYCYCLINE HYCLATE 100 MG
100 TABLET ORAL 2 TIMES DAILY
Qty: 20 TABLET | Refills: 0 | Status: SHIPPED | OUTPATIENT
Start: 2025-03-13 | End: 2025-03-23

## 2025-03-13 RX ORDER — DIPHENHYDRAMINE HCL 25 MG
25 CAPSULE ORAL EVERY 6 HOURS PRN
Qty: 40 CAPSULE | Refills: 0 | Status: SHIPPED | OUTPATIENT
Start: 2025-03-13 | End: 2025-03-23

## 2025-03-13 RX ORDER — PREDNISONE 50 MG/1
50 TABLET ORAL DAILY
Qty: 5 TABLET | Refills: 0 | Status: SHIPPED | OUTPATIENT
Start: 2025-03-13 | End: 2025-03-18

## 2025-03-13 NOTE — ED TRIAGE NOTES
PT to the ED with a rash. PT states rash is all over. PT states rash is itchy. PT appears to have red bumbs on face and body. PT denies fevers

## 2025-03-13 NOTE — DISCHARGE INSTRUCTIONS
Take the antibiotics as directed.  Take the prednisone as directed.  Use the Benadryl as directed on the bottle for itch control.    He also can use other measures for itching control such as Aveeno oatmeal baths    Discharge warning    Please remember that examination and testing performed in the emergency department is not a comprehensive evaluation of all medical conditions and does not replace the need to follow up with your primary care provider.  In the emergency department, we are only able to evaluate your symptoms in the current condition, but symptoms may change or worsen.  Although you are felt safe to be discharged today, if your symptoms persist or change, you need to be re-evaluated by your regular/primary care doctor as soon as possible.  If you are unable to make appointment with your regular doctor, please come back to the ER to be re-evaluated.

## 2025-03-13 NOTE — ED PROVIDER NOTES
[facility] EMERGENCY DEPARTMENT      EMERGENCY MEDICINE     Pt Name: Delmy Siu  MRN: [id number]  Birthdate 1980  Date of evaluation: 3/13/2025  Provider: PATTI Garcia    CHIEF COMPLAINT       Chief Complaint   Patient presents with    Rash     HISTORY OF PRESENT ILLNESS   Delmy Siu is a pleasant 44 y.o. female who presents to the emergency department from home for generalized rash that she has had for about 2 weeks.  She states that it has intense itching.  She has excoriated areas on her face, torso, legs, arms, seems to spare the palms and soles.  No fever or chills.  No nausea or vomiting.  Patient lives in her home, no other family members have this.  She sleeps in same bed with her  he has no rash.    No discharge.  No upper airway symptoms.  No abdominal pain.  No new detergents.  No new exposures.      PASTMEDICAL HISTORY     Past Medical History:   Diagnosis Date    Psychiatric problem        Patient Active Problem List   Diagnosis Code    Pyelonephritis affecting pregnancy in second trimester O23.02    Fetal growth restriction [id number]    Depressive disorder F32.A    Intermittent explosive disorder in adult F63.81    Panic disorder without agoraphobia F41.0    Alcohol use disorder, moderate, dependence (HCC) F10.20    Cannabis use disorder, moderate, dependence (HCC) F12.20    Intermittent explosive disorder F63.81     SURGICAL HISTORY       Past Surgical History:   Procedure Laterality Date    HYSTERECTOMY (CERVIX STATUS UNKNOWN)         CURRENT MEDICATIONS       Discharge Medication List as of 3/13/2025  4:29 PM        CONTINUE these medications which have NOT CHANGED    Details   permethrin (ELIMITE) 5 % cream Apply topically, all spots and scabs, avoid mucus membranes.  Leave on overnight, 8-12 hours.  Rinse prior to washing.  May repeat in 2 weeks if needed., Disp-2 each, R-0, Normal      traZODone (DESYREL) 50 MG tablet Take 1 tablet by mouth nightly as needed for

## 2025-03-30 SDOH — HEALTH STABILITY: PHYSICAL HEALTH: ON AVERAGE, HOW MANY DAYS PER WEEK DO YOU ENGAGE IN MODERATE TO STRENUOUS EXERCISE (LIKE A BRISK WALK)?: 5 DAYS

## 2025-03-30 SDOH — HEALTH STABILITY: PHYSICAL HEALTH: ON AVERAGE, HOW MANY MINUTES DO YOU ENGAGE IN EXERCISE AT THIS LEVEL?: 30 MIN

## 2025-03-31 ENCOUNTER — OFFICE VISIT (OUTPATIENT)
Dept: FAMILY MEDICINE CLINIC | Age: 45
End: 2025-03-31

## 2025-03-31 VITALS
OXYGEN SATURATION: 94 % | HEIGHT: 62 IN | RESPIRATION RATE: 18 BRPM | HEART RATE: 113 BPM | WEIGHT: 103.6 LBS | SYSTOLIC BLOOD PRESSURE: 108 MMHG | DIASTOLIC BLOOD PRESSURE: 74 MMHG | BODY MASS INDEX: 19.06 KG/M2

## 2025-03-31 DIAGNOSIS — Z11.59 ENCOUNTER FOR HEPATITIS C SCREENING TEST FOR LOW RISK PATIENT: ICD-10-CM

## 2025-03-31 DIAGNOSIS — L30.9 DERMATITIS: Primary | ICD-10-CM

## 2025-03-31 DIAGNOSIS — Z13.1 SCREENING FOR DIABETES MELLITUS: ICD-10-CM

## 2025-03-31 DIAGNOSIS — Z13.220 SCREENING FOR LIPID DISORDERS: ICD-10-CM

## 2025-03-31 DIAGNOSIS — Z00.00 ENCOUNTER FOR MEDICAL EXAMINATION TO ESTABLISH CARE: ICD-10-CM

## 2025-03-31 DIAGNOSIS — Z12.31 ENCOUNTER FOR SCREENING MAMMOGRAM FOR MALIGNANT NEOPLASM OF BREAST: ICD-10-CM

## 2025-03-31 DIAGNOSIS — Z11.4 SCREENING FOR HIV (HUMAN IMMUNODEFICIENCY VIRUS): ICD-10-CM

## 2025-03-31 RX ORDER — DIPHENHYDRAMINE HCL 25 MG
25 CAPSULE ORAL EVERY 6 HOURS PRN
Qty: 30 CAPSULE | Refills: 3 | Status: SHIPPED | OUTPATIENT
Start: 2025-03-31 | End: 2025-04-30

## 2025-03-31 RX ORDER — CAMPHOR 0.45 %
GEL (GRAM) TOPICAL
Qty: 28 G | Refills: 0 | Status: SHIPPED | OUTPATIENT
Start: 2025-03-31

## 2025-03-31 RX ORDER — DIPHENHYDRAMINE HCL 25 MG/1
TABLET ORAL
COMMUNITY
Start: 2025-03-13

## 2025-03-31 SDOH — ECONOMIC STABILITY: FOOD INSECURITY: WITHIN THE PAST 12 MONTHS, THE FOOD YOU BOUGHT JUST DIDN'T LAST AND YOU DIDN'T HAVE MONEY TO GET MORE.: NEVER TRUE

## 2025-03-31 SDOH — ECONOMIC STABILITY: FOOD INSECURITY: WITHIN THE PAST 12 MONTHS, YOU WORRIED THAT YOUR FOOD WOULD RUN OUT BEFORE YOU GOT MONEY TO BUY MORE.: NEVER TRUE

## 2025-03-31 ASSESSMENT — PATIENT HEALTH QUESTIONNAIRE - PHQ9
8. MOVING OR SPEAKING SO SLOWLY THAT OTHER PEOPLE COULD HAVE NOTICED. OR THE OPPOSITE, BEING SO FIGETY OR RESTLESS THAT YOU HAVE BEEN MOVING AROUND A LOT MORE THAN USUAL: NOT AT ALL
2. FEELING DOWN, DEPRESSED OR HOPELESS: NOT AT ALL
4. FEELING TIRED OR HAVING LITTLE ENERGY: NOT AT ALL
6. FEELING BAD ABOUT YOURSELF - OR THAT YOU ARE A FAILURE OR HAVE LET YOURSELF OR YOUR FAMILY DOWN: NOT AT ALL
9. THOUGHTS THAT YOU WOULD BE BETTER OFF DEAD, OR OF HURTING YOURSELF: NOT AT ALL
3. TROUBLE FALLING OR STAYING ASLEEP: NOT AT ALL
SUM OF ALL RESPONSES TO PHQ QUESTIONS 1-9: 0
SUM OF ALL RESPONSES TO PHQ QUESTIONS 1-9: 0
5. POOR APPETITE OR OVEREATING: NOT AT ALL
10. IF YOU CHECKED OFF ANY PROBLEMS, HOW DIFFICULT HAVE THESE PROBLEMS MADE IT FOR YOU TO DO YOUR WORK, TAKE CARE OF THINGS AT HOME, OR GET ALONG WITH OTHER PEOPLE: NOT DIFFICULT AT ALL
SUM OF ALL RESPONSES TO PHQ QUESTIONS 1-9: 0
1. LITTLE INTEREST OR PLEASURE IN DOING THINGS: NOT AT ALL
7. TROUBLE CONCENTRATING ON THINGS, SUCH AS READING THE NEWSPAPER OR WATCHING TELEVISION: NOT AT ALL
SUM OF ALL RESPONSES TO PHQ QUESTIONS 1-9: 0

## 2025-03-31 NOTE — PROGRESS NOTES
S: 44 y.o. female with   Chief Complaint   Patient presents with    New Patient     Patient states she has a full body rash that she has had for a while, patient states it is itchy. Patient asked for a work note for today.       HPI: please see resident note for HPI and ROS.    BP Readings from Last 3 Encounters:   03/31/25 108/74   03/13/25 115/64   05/22/24 115/70     Wt Readings from Last 3 Encounters:   03/31/25 47 kg (103 lb 9.6 oz)   03/13/25 54.4 kg (120 lb)   05/22/24 52.2 kg (115 lb)       O: VS:  height is 1.575 m (5' 2\") and weight is 47 kg (103 lb 9.6 oz). Her blood pressure is 108/74 and her pulse is 113 (abnormal). Her respiration is 18 and oxygen saturation is 94%.   AAO/NAD, appropriate affect for mood  CV:  RRR, no murmur  Resp: CTAB  Skin: as pictured in media tab     Diagnosis Orders   1. Dermatitis  Celiac Disease Panel    diphenhydrAMINE-zinc acetate (BENADRYL ITCH STOPPING) 1-0.1 % cream    diphenhydrAMINE (BENADRYL ALLERGY) 25 MG capsule      2. Screening for HIV (human immunodeficiency virus)  HIV Screen      3. Encounter for hepatitis C screening test for low risk patient  Hepatitis C Antibody      4. Screening for diabetes mellitus  Hemoglobin A1C      5. Screening for lipid disorders  Lipid Panel      6. Encounter for screening mammogram for malignant neoplasm of breast  JEANNIE DIGITAL SCREEN W OR WO CAD BILATERAL      7. Encounter for medical examination to establish care  Comprehensive Metabolic Panel    CBC with Auto Differential          Plan:  Please refer to resident note for full plan.    44 year old female presents to the office to establish as a new patient.     Plan for mammogram for breast cancer screening.    Will follow up for cervical cancer screening,    Plan to get screening labs for preventative care    Rash: new onset, wide spread papular pruritic erythematous rash across entire body. Was given prednisone in er, which seems to help some. Has been using benadryl which has 
Patient to schedule procedure visit for her Pap, possible biopsy versus dermatology referral.  - Celiac Disease Panel; Future  - diphenhydrAMINE-zinc acetate (BENADRYL ITCH STOPPING) 1-0.1 % cream; Apply topically 3 times daily as needed.  Dispense: 28 g; Refill: 0  - diphenhydrAMINE (BENADRYL ALLERGY) 25 MG capsule; Take 1 capsule by mouth every 6 hours as needed for Itching  Dispense: 30 capsule; Refill: 3    2. Screening for HIV (human immunodeficiency virus)  - HIV Screen; Future    3. Encounter for hepatitis C screening test for low risk patient  - Hepatitis C Antibody; Future    4. Screening for diabetes mellitus  - Hemoglobin A1C; Future    5. Screening for lipid disorders  - Lipid Panel; Future    6. Encounter for screening mammogram for malignant neoplasm of breast  Due for breast cancer screening, has never had mammogram before.  Is amenable to obtaining mammogram.  Order placed.  - JEANNIE DIGITAL SCREEN W OR WO CAD BILATERAL; Future    7. Encounter for medical examination to establish care  Will obtain basic labs.  - Comprehensive Metabolic Panel; Future  - CBC with Auto Differential; Future                   Preventative Care Gaps: Are appropriate based on today's review and evaluation, Screening Mammography, Screening Pap and pelvic (covered once every 2 years), and Diabetes screening test  Patient Education Reviewed: Disease process and treatment, Prevention, detection, and treatment of acute complications, Prevention, detection, and treatment of chronic complications, Using medications safely    Future Appointments   Date Time Provider Department Center   4/14/2025  3:00 PM Verena Finnegan DO SRPX  [facility] DEP       No follow-ups on file.      An electronic signature was used to authenticate this note  - Verena Finnegan DO PGY-1 on 4/1/2025 at 5:16 PM

## 2025-04-01 ENCOUNTER — LAB (OUTPATIENT)
Dept: LAB | Age: 45
End: 2025-04-01

## 2025-04-01 DIAGNOSIS — Z11.4 SCREENING FOR HIV (HUMAN IMMUNODEFICIENCY VIRUS): ICD-10-CM

## 2025-04-01 DIAGNOSIS — Z13.220 SCREENING FOR LIPID DISORDERS: ICD-10-CM

## 2025-04-01 DIAGNOSIS — L30.9 DERMATITIS: ICD-10-CM

## 2025-04-01 DIAGNOSIS — Z00.00 ENCOUNTER FOR MEDICAL EXAMINATION TO ESTABLISH CARE: ICD-10-CM

## 2025-04-01 DIAGNOSIS — Z11.59 ENCOUNTER FOR HEPATITIS C SCREENING TEST FOR LOW RISK PATIENT: ICD-10-CM

## 2025-04-01 DIAGNOSIS — Z13.1 SCREENING FOR DIABETES MELLITUS: ICD-10-CM

## 2025-04-01 LAB
ALBUMIN SERPL BCG-MCNC: 3.8 G/DL (ref 3.4–4.9)
ALP SERPL-CCNC: 87 U/L (ref 35–104)
ALT SERPL W/O P-5'-P-CCNC: 13 U/L (ref 10–35)
ANION GAP SERPL CALC-SCNC: 9 MEQ/L (ref 8–16)
AST SERPL-CCNC: 22 U/L (ref 10–35)
BASOPHILS ABSOLUTE: 0 THOU/MM3 (ref 0–0.1)
BASOPHILS NFR BLD AUTO: 0.5 %
BILIRUB SERPL-MCNC: 0.2 MG/DL (ref 0.3–1.2)
BUN SERPL-MCNC: 11 MG/DL (ref 8–23)
CALCIUM SERPL-MCNC: 9.5 MG/DL (ref 8.6–10)
CHLORIDE SERPL-SCNC: 107 MEQ/L (ref 98–111)
CHOLEST SERPL-MCNC: 175 MG/DL (ref 100–199)
CO2 SERPL-SCNC: 24 MEQ/L (ref 22–29)
CREAT SERPL-MCNC: 1 MG/DL (ref 0.5–0.9)
DEPRECATED MEAN GLUCOSE BLD GHB EST-ACNC: 108 MG/DL (ref 70–126)
DEPRECATED RDW RBC AUTO: 55.3 FL (ref 35–45)
EOSINOPHIL NFR BLD AUTO: 2.5 %
EOSINOPHILS ABSOLUTE: 0.1 THOU/MM3 (ref 0–0.4)
ERYTHROCYTE [DISTWIDTH] IN BLOOD BY AUTOMATED COUNT: 16.4 % (ref 11.5–14.5)
GFR SERPL CREATININE-BSD FRML MDRD: 71 ML/MIN/1.73M2
GLUCOSE SERPL-MCNC: 89 MG/DL (ref 74–109)
HBA1C MFR BLD HPLC: 5.6 % (ref 4–6)
HCT VFR BLD AUTO: 41.7 % (ref 37–47)
HCV IGG SERPL QL IA: NONREACTIVE
HDLC SERPL-MCNC: 60 MG/DL
HGB BLD-MCNC: 12.8 GM/DL (ref 12–16)
HIV 1+2 AB+HIV1 P24 AG SERPL QL IA: NORMAL
IMM GRANULOCYTES # BLD AUTO: 0.02 THOU/MM3 (ref 0–0.07)
IMM GRANULOCYTES NFR BLD AUTO: 0.4 %
LDLC SERPL CALC-MCNC: 100 MG/DL
LYMPHOCYTES ABSOLUTE: 1.7 THOU/MM3 (ref 1–4.8)
LYMPHOCYTES NFR BLD AUTO: 30.7 %
MCH RBC QN AUTO: 28.1 PG (ref 26–33)
MCHC RBC AUTO-ENTMCNC: 30.7 GM/DL (ref 32.2–35.5)
MCV RBC AUTO: 91.4 FL (ref 81–99)
MONOCYTES ABSOLUTE: 0.4 THOU/MM3 (ref 0.4–1.3)
MONOCYTES NFR BLD AUTO: 7.8 %
NEUTROPHILS ABSOLUTE: 3.3 THOU/MM3 (ref 1.8–7.7)
NEUTROPHILS NFR BLD AUTO: 58.1 %
NRBC BLD AUTO-RTO: 0 /100 WBC
PLATELET # BLD AUTO: 310 THOU/MM3 (ref 130–400)
PMV BLD AUTO: 9.1 FL (ref 9.4–12.4)
POTASSIUM SERPL-SCNC: 4.6 MEQ/L (ref 3.5–5.2)
PROT SERPL-MCNC: 6.7 G/DL (ref 6.4–8.3)
RBC # BLD AUTO: 4.56 MILL/MM3 (ref 4.2–5.4)
SODIUM SERPL-SCNC: 140 MEQ/L (ref 135–145)
TRIGL SERPL-MCNC: 74 MG/DL (ref 0–199)
WBC # BLD AUTO: 5.7 THOU/MM3 (ref 4.8–10.8)

## 2025-04-03 ENCOUNTER — RESULTS FOLLOW-UP (OUTPATIENT)
Dept: FAMILY MEDICINE CLINIC | Age: 45
End: 2025-04-03

## 2025-04-03 LAB
GLIADIN PEPTIDE IGA SER IA-ACNC: < 0.72 FLU (ref 0–4.99)
GLIADIN PEPTIDE IGG SER IA-ACNC: < 0.56 FLU (ref 0–4.99)
TTG IGA SER IA-ACNC: < 1.02 FLU (ref 0–4.99)
TTG IGG SER IA-ACNC: < 0.82 FLU (ref 0–4.99)

## 2025-04-14 ENCOUNTER — LAB (OUTPATIENT)
Dept: LAB | Age: 45
End: 2025-04-14

## 2025-04-14 ENCOUNTER — PROCEDURE VISIT (OUTPATIENT)
Dept: FAMILY MEDICINE CLINIC | Age: 45
End: 2025-04-14
Payer: MEDICAID

## 2025-04-14 VITALS
OXYGEN SATURATION: 98 % | HEART RATE: 107 BPM | TEMPERATURE: 98.1 F | BODY MASS INDEX: 18.88 KG/M2 | SYSTOLIC BLOOD PRESSURE: 114 MMHG | RESPIRATION RATE: 18 BRPM | DIASTOLIC BLOOD PRESSURE: 76 MMHG | HEIGHT: 62 IN | WEIGHT: 102.6 LBS

## 2025-04-14 DIAGNOSIS — Z12.4 CERVICAL CANCER SCREENING: ICD-10-CM

## 2025-04-14 DIAGNOSIS — L98.9 SKIN LESIONS, GENERALIZED: Primary | ICD-10-CM

## 2025-04-14 PROCEDURE — 11104 PUNCH BX SKIN SINGLE LESION: CPT | Performed by: STUDENT IN AN ORGANIZED HEALTH CARE EDUCATION/TRAINING PROGRAM

## 2025-04-14 NOTE — PROGRESS NOTES
S: 44 y.o. female with   Chief Complaint   Patient presents with    Follow-up     Rash all over body again - says it's very itchy. Lab work complete.        HPI: please see resident note for HPI and ROS.    BP Readings from Last 3 Encounters:   04/14/25 114/76   03/31/25 108/74   03/13/25 115/64     Wt Readings from Last 3 Encounters:   04/14/25 46.5 kg (102 lb 9.6 oz)   03/31/25 47 kg (103 lb 9.6 oz)   03/13/25 54.4 kg (120 lb)       O: VS:  height is 1.575 m (5' 2\") and weight is 46.5 kg (102 lb 9.6 oz). Her oral temperature is 98.1 °F (36.7 °C). Her blood pressure is 114/76 and her pulse is 107 (abnormal). Her respiration is 18 and oxygen saturation is 98%.   AAO/NAD, appropriate affect for mood  CV:  RRR, no murmur  Resp: CTAB  Skin: as pictured in media tab     Diagnosis Orders   1. Skin lesions, generalized  PUNCH BIOPSY SKIN SINGLE LESION      2. Cervical cancer screening  PAP SMEAR          Plan:  Please refer to resident note for full plan.    44-year-old female presents the office for multiple concerns.  Patient has a chronic skin rash with unclear etiology.  Has been unresponsive to multiple therapies including steroids, Benadryl/antihistamines, permethrin.  It is still very itchy and uncomfortable.  Will plan for punch biopsy today.  Please refer to resident note for full procedure documentation.  Patient overall tolerated procedure well.  Will send to pathology for further evaluation.  Patient is also due for cervical cancer screening/Pap smear.  This was also completed today in the office.  Please refer to resident note for full procedure documentation.  Otherwise no concerns.    Health Maintenance Due   Topic Date Due    Varicella vaccine (1 of 2 - 13+ 2-dose series) Never done    Hepatitis B vaccine (1 of 3 - 19+ 3-dose series) Never done    Cervical cancer screen  Never done    Pneumococcal 0-49 years Vaccine (2 of 2 - PPSV23) 01/27/2017    Breast cancer screen  Never done    COVID-19 Vaccine (1 - 
excoriations due to scratching.  Please see media tab for attached pictures   Neurological:      General: No focal deficit present.      Mental Status: She is alert.   Psychiatric:         Mood and Affect: Mood normal.             ASSESSMENT & PLAN     1. Skin lesions, generalized  -     PUNCH BIOPSY SKIN SINGLE LESION  2. Cervical cancer screening  -     PAP SMEAR      Medical Decision Makin. Skin lesions, generalized  Chronic, uncontrolled. Patient with 1 year history of widespread papular pruritic rash. Previously treated with permethrin, prednisone burst, doxycycline, Benadryl without any improvement. Lifestyle modifications at home without any improvement. Recent lab work non-contributory. Patient here today for scheduled punch biopsy.   - PUNCH BIOPSY SKIN SINGLE LESION    After informed written consent was obtained, using Betadine for cleansing and 1% Lidocaine without epinephrine for anesthetic, with sterile technique a 3 mm punch biopsy was used to obtain a biopsy specimen of the lesion. Hemostasis was obtained by pressure and wound was not sutured. Antibiotic dressing is applied, and wound care instructions provided. Be alert for any signs of cutaneous infection. The specimen is labeled and sent to pathology for evaluation. The procedure was well tolerated without complications.     2. Cervical cancer screening  Pelvic examination with abnormal findings. Questionable ?eversion of cervix versus dysplastic lesion. No discrete masses identified. Pap smear was obtained. Will await results with co-testing. Patient is high-risk.   - PAP SMEAR               Patient Education Reviewed: Disease process and treatment, Prevention, detection, and treatment of acute complications, Prevention, detection, and treatment of chronic complications    Future Appointments   Date Time Provider Department Center   2025 10:40 AM Verena Finnegan DO SRP[facility]  [facility] DEP       Return in about 2 weeks (around

## 2025-04-17 ENCOUNTER — RESULTS FOLLOW-UP (OUTPATIENT)
Dept: INTERNAL MEDICINE CLINIC | Age: 45
End: 2025-04-17

## 2025-04-17 NOTE — TELEPHONE ENCOUNTER
----- Message from Dr. Verena Finnegan DO sent at 4/17/2025  9:24 AM EDT -----  Reviewed results. Will discuss with patient at upcoming follow up appointment.

## 2025-04-23 ENCOUNTER — RESULTS FOLLOW-UP (OUTPATIENT)
Age: 45
End: 2025-04-23

## 2025-04-23 LAB — CYTOLOGY THIN PREP PAP: NORMAL

## 2025-04-29 ENCOUNTER — OFFICE VISIT (OUTPATIENT)
Dept: FAMILY MEDICINE CLINIC | Age: 45
End: 2025-04-29
Payer: MEDICAID

## 2025-04-29 VITALS
HEART RATE: 85 BPM | DIASTOLIC BLOOD PRESSURE: 70 MMHG | OXYGEN SATURATION: 99 % | TEMPERATURE: 98.2 F | SYSTOLIC BLOOD PRESSURE: 118 MMHG | HEIGHT: 62 IN | RESPIRATION RATE: 12 BRPM | BODY MASS INDEX: 19.03 KG/M2 | WEIGHT: 103.4 LBS

## 2025-04-29 DIAGNOSIS — Z12.31 ENCOUNTER FOR SCREENING MAMMOGRAM FOR MALIGNANT NEOPLASM OF BREAST: Primary | ICD-10-CM

## 2025-04-29 DIAGNOSIS — L98.9 SKIN LESIONS, GENERALIZED: ICD-10-CM

## 2025-04-29 PROCEDURE — G8420 CALC BMI NORM PARAMETERS: HCPCS

## 2025-04-29 PROCEDURE — 4004F PT TOBACCO SCREEN RCVD TLK: CPT

## 2025-04-29 PROCEDURE — G8427 DOCREV CUR MEDS BY ELIG CLIN: HCPCS

## 2025-04-29 PROCEDURE — 99213 OFFICE O/P EST LOW 20 MIN: CPT

## 2025-04-29 NOTE — PATIENT INSTRUCTIONS
Thank you   Thank you for trusting us with your healthcare needs. You may receive a survey regarding today's visit. It would help us out if you would take a few moments to provide your feedback. We value your input.  Please bring in ALL medications BOTTLES, including inhalers, herbal supplements, over the counter, prescribed & non-prescribed medicine. The office would like actual medication bottles and a list.         4.  Prior to getting your labs drawn, check with your insurance company for benefits and eligibility of lab services.  Often, insurance companies cover certain tests for preventative visits only.  It is patient's responsibility to    see what is covered.    5.  If the list below has been completed, PLEASE FAX RECORDS TO OUR OFFICE @ [phone]. Once the records have been received we will update your records at our office:  Health Maintenance Due   Topic Date Due    Varicella vaccine (1 of 2 - 13+ 2-dose series) Never done    Hepatitis B vaccine (1 of 3 - 19+ 3-dose series) Never done    Pneumococcal 0-49 years Vaccine (2 of 2 - PPSV23) 01/27/2017    Breast cancer screen  Never done    COVID-19 Vaccine (1 - 2024-25 season) Never done    DTaP/Tdap/Td vaccine (3 - Td or Tdap) 04/19/2025

## 2025-04-29 NOTE — PROGRESS NOTES
Health Maintenance Due   Topic Date Due    Varicella vaccine (1 of 2 - 13+ 2-dose series) Never done    Hepatitis B vaccine (1 of 3 - 19+ 3-dose series) Never done    Pneumococcal 0-49 years Vaccine (2 of 2 - PPSV23) 01/27/2017    Breast cancer screen  Never done    COVID-19 Vaccine (1 - 2024-25 season) Never done    DTaP/Tdap/Td vaccine (3 - Td or Tdap) 04/19/2025

## 2025-04-29 NOTE — PROGRESS NOTES
Patient:Delmy Siu  YOB: 1980  MRN: [id number]    SUBJECTIVE   44 y.o. female who presents for the followin Week Follow-Up      Delmy Siu is a 44 y.o. woman with no significant PMHx who is presenting to this clinic for follow up.    Diffuse rash: Rash to b/l legs, arms, back, face. Very pruritic and states she has a compulsion to scratch.  Rash waxing and waning for the past 1 year.  Previously treated with permethrin cream for presumed scabies without any improvement.  Recently seen in ED 2 weeks ago and given 5-day prednisone burst, Benadryl, doxycycline course.  Reports improvement in rash to face but nowhere else.  Benadryl helps with itching.  Patient has been diligent about washing sheets and laundry at home.  Has switched detergents numerous times without any improvement.  Sleeps in same bed with her  who does not have the same rash.  Her 3 children in the same household do not have similar symptoms.  No new pets or other household changes prior to rash onset.     Since last time - Worsening and still present diffusely across body. Using OTC hydrocortisone cream with some relief of itching.                          Need for mammogram screening. Would like ordered.                        Review of Systems         Pertinent positives and negatives as documented in HPI. All other systems negative.       Patient History          Past Medical History:  She has a past medical history of Psychiatric problem.    Social History:  She reports that she has been smoking cigarettes. She started smoking about 13 years ago. She has a 6.8 pack-year smoking history. She has never used smokeless tobacco. She reports current drug use. Frequency: 3.00 times per week. Drug: Marijuana (Weed). She reports that she does not drink alcohol.     Surgical History:   Past Surgical History:   Procedure Laterality Date    TUBAL LIGATION Bilateral 2016        Family Medical History:   Family History

## 2025-07-28 ENCOUNTER — OFFICE VISIT (OUTPATIENT)
Dept: FAMILY MEDICINE CLINIC | Age: 45
End: 2025-07-28
Payer: MEDICAID

## 2025-07-28 VITALS
HEART RATE: 105 BPM | SYSTOLIC BLOOD PRESSURE: 100 MMHG | BODY MASS INDEX: 18.58 KG/M2 | HEIGHT: 62 IN | TEMPERATURE: 98.6 F | RESPIRATION RATE: 18 BRPM | DIASTOLIC BLOOD PRESSURE: 60 MMHG | WEIGHT: 101 LBS | OXYGEN SATURATION: 99 %

## 2025-07-28 DIAGNOSIS — L98.9 SKIN LESIONS, GENERALIZED: Primary | ICD-10-CM

## 2025-07-28 DIAGNOSIS — L21.9 SEBORRHEIC DERMATITIS OF SCALP: ICD-10-CM

## 2025-07-28 DIAGNOSIS — D72.10 EOSINOPHILIA, UNSPECIFIED TYPE: ICD-10-CM

## 2025-07-28 PROCEDURE — 4004F PT TOBACCO SCREEN RCVD TLK: CPT

## 2025-07-28 PROCEDURE — 99214 OFFICE O/P EST MOD 30 MIN: CPT

## 2025-07-28 PROCEDURE — G8427 DOCREV CUR MEDS BY ELIG CLIN: HCPCS

## 2025-07-28 PROCEDURE — G8419 CALC BMI OUT NRM PARAM NOF/U: HCPCS

## 2025-07-28 RX ORDER — CAMPHOR 0.45 %
GEL (GRAM) TOPICAL
Qty: 28 G | Refills: 0 | Status: SHIPPED | OUTPATIENT
Start: 2025-07-28

## 2025-07-28 RX ORDER — CETIRIZINE HYDROCHLORIDE 10 MG/1
10 TABLET ORAL DAILY
Qty: 30 TABLET | Refills: 0 | Status: SHIPPED | OUTPATIENT
Start: 2025-07-28

## 2025-07-28 RX ORDER — MONTELUKAST SODIUM 10 MG/1
10 TABLET ORAL DAILY
Qty: 30 TABLET | Refills: 3 | Status: SHIPPED | OUTPATIENT
Start: 2025-07-28

## 2025-07-28 NOTE — PROGRESS NOTES
Patient:Delmy Siu  YOB: 1980  MRN: [id number]    SUBJECTIVE   Delmy Siu is a 45 y.o. female with a past medical history of insomnia who presents to the [facility] for Rash (Recurring, was seen in April 2025 and hasn't gone away or gotten better)    Pt reports chronic rash affecting her bilateral arms, legs, back, face for the last year. She uses benadryl for itching with some relief. She reports the rash began on her face and progressed down her neck/back/arms/legs. She denies full-body itchiness but instead it will \"travel\" down to her neck --> arms --> legs throughout the day. Unable to see dermatology due to insurance. She has tried multiple medications, including permethrin cream, doxycycline, prednisone taper without improvemnet. She notes changing different laundry detergents to see if the rash would improve without relief. No one in her household has this rash. She denies any new pets or other exposures. She has tried avoiding gluten without improvement to her skin. Pt denies any chest pain, shortness of breath, palpitations, dyspnea, fevers, chills, nausea, vomiting, constipation, or diarrhea.     Additionally, she noted a new scalp lesion which is painless and not itchy that appeared a few days ago. She also started using head-and-shoulders shampoo yesterday because of this.     Review of Systems         Pertinent positives and negatives as documented in HPI. All other systems negative.     Patient History          Past Medical History:  She has a past medical history of Psychiatric problem.    Social History:  She reports that she has been smoking cigarettes. She started smoking about 13 years ago. She has a 6.9 pack-year smoking history. She has never used smokeless tobacco. She reports current drug use. Frequency: 3.00 times per week. Drug: Marijuana (Weed). She reports that she does not drink alcohol.     Surgical History:   Past Surgical

## 2025-07-28 NOTE — PROGRESS NOTES
S: 45 y.o. female with   Chief Complaint   Patient presents with    Rash     Recurring, was seen in April 2025 and hasn't gone away or gotten better       HPI: please see resident note for HPI and ROS.    BP Readings from Last 3 Encounters:   07/28/25 100/60   04/29/25 118/70   04/14/25 114/76     Wt Readings from Last 3 Encounters:   07/28/25 45.8 kg (101 lb)   04/29/25 46.9 kg (103 lb 6.4 oz)   04/14/25 46.5 kg (102 lb 9.6 oz)       O: VS:  height is 1.575 m (5' 2\") and weight is 45.8 kg (101 lb). Her temporal temperature is 98.6 °F (37 °C). Her blood pressure is 100/60 and her pulse is 105 (abnormal). Her respiration is 18 and oxygen saturation is 99%.   AAO/NAD, appropriate affect for mood  CV:  RRR, no murmur  Resp: CTAB  Skin rash as noted in media tab     Diagnosis Orders   1. Skin lesions, generalized  cetirizine (ZYRTEC) 10 MG tablet    montelukast (SINGULAIR) 10 MG tablet    diphenhydrAMINE-zinc acetate (BENADRYL ITCH STOPPING) 1-0.1 % cream      2. Eosinophilia, unspecified type  cetirizine (ZYRTEC) 10 MG tablet    montelukast (SINGULAIR) 10 MG tablet      3. Seborrheic dermatitis of scalp            Plan:  Please refer to resident note for full plan.    45 year old female presents to the office for concerns of rash. She has had a rash for about a 1 year or so now. 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     Has tried permethrin, doxycyline, prednisone without any improvement on anything. Still getting new spots and that pop up randomly. At this point there is unclear etiology without clear direction. Will plan to pursue dermatology evaluation for further work up.     Has already had punch biopsy, lab evaluation including celiacs work up. Will add additional labs and trial allergy medication due to eosinophils.     Health Maintenance Due   Topic Date Due    Varicella vaccine (1 of 2 - 13+ 2-dose series) Never done